# Patient Record
Sex: FEMALE | Race: BLACK OR AFRICAN AMERICAN | NOT HISPANIC OR LATINO | URBAN - METROPOLITAN AREA
[De-identification: names, ages, dates, MRNs, and addresses within clinical notes are randomized per-mention and may not be internally consistent; named-entity substitution may affect disease eponyms.]

---

## 2023-08-25 LAB
EXTERNAL ABO GROUPING: NORMAL
EXTERNAL ANTIBODY SCREEN: NORMAL
EXTERNAL CHLAMYDIA SCREEN: NEGATIVE
EXTERNAL GONORRHEA SCREEN: NEGATIVE
EXTERNAL HEPATITIS B SURFACE ANTIGEN: NEGATIVE
EXTERNAL RH FACTOR: POSITIVE
EXTERNAL RUBELLA IGG QUANTITATION: POSITIVE
HEMOGLOBIN IDENTIFICATION INTERPRETATION EXTERNAL: NORMAL
HEPATITIS C VIRUS AB PRESENCE IN SERUM EXTERNAL: NONREACTIVE
HIV 1/ 2 AG/AB SCREEN EXTERNAL: NORMAL
SYPHILIS TOTAL AB EXTERNAL: NONREACTIVE

## 2023-12-14 ENCOUNTER — ANCILLARY PROCEDURE (OUTPATIENT)
Dept: RADIOLOGY | Facility: CLINIC | Age: 43
End: 2023-12-14
Payer: COMMERCIAL

## 2023-12-14 DIAGNOSIS — Z34.90 ENCOUNTER FOR SUPERVISION OF NORMAL PREGNANCY, UNSPECIFIED, UNSPECIFIED TRIMESTER (HHS-HCC): ICD-10-CM

## 2023-12-14 DIAGNOSIS — O09.529 AMA (ADVANCED MATERNAL AGE) MULTIGRAVIDA 35+ (HHS-HCC): ICD-10-CM

## 2023-12-14 PROCEDURE — 76811 OB US DETAILED SNGL FETUS: CPT | Performed by: OBSTETRICS & GYNECOLOGY

## 2023-12-14 PROCEDURE — 76811 OB US DETAILED SNGL FETUS: CPT

## 2023-12-14 PROCEDURE — 76819 FETAL BIOPHYS PROFIL W/O NST: CPT

## 2023-12-14 PROCEDURE — 76819 FETAL BIOPHYS PROFIL W/O NST: CPT | Performed by: OBSTETRICS & GYNECOLOGY

## 2023-12-23 LAB — GLUCOSE, 1 HR SCREEN, PREG EXTERNAL: 144 MG/DL

## 2024-01-11 ENCOUNTER — ANCILLARY PROCEDURE (OUTPATIENT)
Dept: RADIOLOGY | Facility: CLINIC | Age: 44
End: 2024-01-11
Payer: COMMERCIAL

## 2024-01-11 DIAGNOSIS — Z34.90 ENCOUNTER FOR SUPERVISION OF NORMAL PREGNANCY, UNSPECIFIED, UNSPECIFIED TRIMESTER (HHS-HCC): ICD-10-CM

## 2024-01-11 PROCEDURE — 76816 OB US FOLLOW-UP PER FETUS: CPT | Performed by: OBSTETRICS & GYNECOLOGY

## 2024-01-11 PROCEDURE — 76819 FETAL BIOPHYS PROFIL W/O NST: CPT

## 2024-01-11 PROCEDURE — 76816 OB US FOLLOW-UP PER FETUS: CPT

## 2024-01-11 PROCEDURE — 76819 FETAL BIOPHYS PROFIL W/O NST: CPT | Performed by: OBSTETRICS & GYNECOLOGY

## 2024-01-16 LAB
GLUCOSE THREE HOUR EXTERNAL: 119 MG/DL
GLUCOSE TWO HOUR EXTERNAL: 190 MG/DL
GLUCOSE, FASTING EXTERNAL: 74 MG/DL
GLUCOSE, ONE HOUR EXTERNAL: 182 MG/DL

## 2024-02-02 LAB — GROUP B STREP SCREEN EXTERNAL: NORMAL

## 2024-02-06 ENCOUNTER — HOSPITAL ENCOUNTER (OUTPATIENT)
Dept: RADIOLOGY | Facility: CLINIC | Age: 44
Discharge: HOME | End: 2024-02-06
Payer: COMMERCIAL

## 2024-02-06 DIAGNOSIS — O16.9 HYPERTENSION AFFECTING PREGNANCY (HHS-HCC): ICD-10-CM

## 2024-02-06 DIAGNOSIS — Z34.90 PREGNANT (HHS-HCC): ICD-10-CM

## 2024-02-06 PROCEDURE — 76819 FETAL BIOPHYS PROFIL W/O NST: CPT | Performed by: OBSTETRICS & GYNECOLOGY

## 2024-02-06 PROCEDURE — 76816 OB US FOLLOW-UP PER FETUS: CPT | Performed by: OBSTETRICS & GYNECOLOGY

## 2024-02-06 PROCEDURE — 76819 FETAL BIOPHYS PROFIL W/O NST: CPT

## 2024-02-06 PROCEDURE — 76816 OB US FOLLOW-UP PER FETUS: CPT

## 2024-02-19 ENCOUNTER — ANESTHESIA EVENT (OUTPATIENT)
Dept: OBSTETRICS AND GYNECOLOGY | Facility: HOSPITAL | Age: 44
End: 2024-02-19
Payer: COMMERCIAL

## 2024-02-19 ENCOUNTER — HOSPITAL ENCOUNTER (INPATIENT)
Facility: HOSPITAL | Age: 44
LOS: 4 days | Discharge: HOME | End: 2024-02-23
Attending: OBSTETRICS & GYNECOLOGY | Admitting: OBSTETRICS & GYNECOLOGY
Payer: COMMERCIAL

## 2024-02-19 ENCOUNTER — APPOINTMENT (OUTPATIENT)
Dept: OBSTETRICS AND GYNECOLOGY | Facility: HOSPITAL | Age: 44
End: 2024-02-19
Payer: COMMERCIAL

## 2024-02-19 ENCOUNTER — ANESTHESIA (OUTPATIENT)
Dept: OBSTETRICS AND GYNECOLOGY | Facility: HOSPITAL | Age: 44
End: 2024-02-19
Payer: COMMERCIAL

## 2024-02-19 DIAGNOSIS — O10.919 CHRONIC HYPERTENSION AFFECTING PREGNANCY (HHS-HCC): Primary | ICD-10-CM

## 2024-02-19 PROBLEM — I10 HTN (HYPERTENSION): Status: ACTIVE | Noted: 2024-02-19

## 2024-02-19 PROBLEM — Z3A.39 PREGNANCY WITH 39 COMPLETED WEEKS GESTATION (HHS-HCC): Status: ACTIVE | Noted: 2024-02-19

## 2024-02-19 LAB
ABO GROUP (TYPE) IN BLOOD: NORMAL
ABO GROUP (TYPE) IN BLOOD: NORMAL
ALBUMIN SERPL BCP-MCNC: 3.2 G/DL (ref 3.4–5)
ALP SERPL-CCNC: 142 U/L (ref 33–110)
ALT SERPL W P-5'-P-CCNC: 17 U/L (ref 7–45)
ANION GAP SERPL CALC-SCNC: 14 MMOL/L (ref 10–20)
ANTIBODY SCREEN: NORMAL
AST SERPL W P-5'-P-CCNC: 22 U/L (ref 9–39)
BILIRUB SERPL-MCNC: 0.6 MG/DL (ref 0–1.2)
BUN SERPL-MCNC: 10 MG/DL (ref 6–23)
CALCIUM SERPL-MCNC: 9 MG/DL (ref 8.6–10.6)
CHLORIDE SERPL-SCNC: 107 MMOL/L (ref 98–107)
CO2 SERPL-SCNC: 23 MMOL/L (ref 21–32)
CREAT SERPL-MCNC: 0.69 MG/DL (ref 0.5–1.05)
CREAT UR-MCNC: 152.1 MG/DL (ref 20–320)
EGFRCR SERPLBLD CKD-EPI 2021: >90 ML/MIN/1.73M*2
ERYTHROCYTE [DISTWIDTH] IN BLOOD BY AUTOMATED COUNT: 15.9 % (ref 11.5–14.5)
GLUCOSE BLD MANUAL STRIP-MCNC: 81 MG/DL (ref 74–99)
GLUCOSE SERPL-MCNC: 60 MG/DL (ref 74–99)
HCT VFR BLD AUTO: 34 % (ref 36–46)
HGB BLD-MCNC: 11.1 G/DL (ref 12–16)
MCH RBC QN AUTO: 24.9 PG (ref 26–34)
MCHC RBC AUTO-ENTMCNC: 32.6 G/DL (ref 32–36)
MCV RBC AUTO: 76 FL (ref 80–100)
NRBC BLD-RTO: 0.9 /100 WBCS (ref 0–0)
PLATELET # BLD AUTO: 209 X10*3/UL (ref 150–450)
POTASSIUM SERPL-SCNC: 4.5 MMOL/L (ref 3.5–5.3)
PROT SERPL-MCNC: 5.4 G/DL (ref 6.4–8.2)
PROT UR-ACNC: 20 MG/DL (ref 5–24)
PROT/CREAT UR: 0.13 MG/MG CREAT (ref 0–0.17)
RBC # BLD AUTO: 4.46 X10*6/UL (ref 4–5.2)
RH FACTOR (ANTIGEN D): NORMAL
RH FACTOR (ANTIGEN D): NORMAL
SODIUM SERPL-SCNC: 139 MMOL/L (ref 136–145)
TREPONEMA PALLIDUM IGG+IGM AB [PRESENCE] IN SERUM OR PLASMA BY IMMUNOASSAY: NONREACTIVE
WBC # BLD AUTO: 5.6 X10*3/UL (ref 4.4–11.3)

## 2024-02-19 PROCEDURE — 82947 ASSAY GLUCOSE BLOOD QUANT: CPT

## 2024-02-19 PROCEDURE — 82570 ASSAY OF URINE CREATININE: CPT

## 2024-02-19 PROCEDURE — 86780 TREPONEMA PALLIDUM: CPT | Performed by: STUDENT IN AN ORGANIZED HEALTH CARE EDUCATION/TRAINING PROGRAM

## 2024-02-19 PROCEDURE — 1120000001 HC OB PRIVATE ROOM DAILY

## 2024-02-19 PROCEDURE — 36415 COLL VENOUS BLD VENIPUNCTURE: CPT | Performed by: OBSTETRICS & GYNECOLOGY

## 2024-02-19 PROCEDURE — 3E0P7VZ INTRODUCTION OF HORMONE INTO FEMALE REPRODUCTIVE, VIA NATURAL OR ARTIFICIAL OPENING: ICD-10-PCS | Performed by: OBSTETRICS & GYNECOLOGY

## 2024-02-19 PROCEDURE — 7210000002 HC LABOR PER HOUR

## 2024-02-19 PROCEDURE — 36415 COLL VENOUS BLD VENIPUNCTURE: CPT | Performed by: STUDENT IN AN ORGANIZED HEALTH CARE EDUCATION/TRAINING PROGRAM

## 2024-02-19 PROCEDURE — 85027 COMPLETE CBC AUTOMATED: CPT | Performed by: STUDENT IN AN ORGANIZED HEALTH CARE EDUCATION/TRAINING PROGRAM

## 2024-02-19 PROCEDURE — 99222 1ST HOSP IP/OBS MODERATE 55: CPT | Performed by: STUDENT IN AN ORGANIZED HEALTH CARE EDUCATION/TRAINING PROGRAM

## 2024-02-19 PROCEDURE — 2500000001 HC RX 250 WO HCPCS SELF ADMINISTERED DRUGS (ALT 637 FOR MEDICARE OP): Performed by: ADVANCED PRACTICE MIDWIFE

## 2024-02-19 PROCEDURE — 86901 BLOOD TYPING SEROLOGIC RH(D): CPT | Performed by: STUDENT IN AN ORGANIZED HEALTH CARE EDUCATION/TRAINING PROGRAM

## 2024-02-19 PROCEDURE — 80053 COMPREHEN METABOLIC PANEL: CPT

## 2024-02-19 RX ORDER — MISOPROSTOL 200 UG/1
800 TABLET ORAL ONCE AS NEEDED
Status: DISCONTINUED | OUTPATIENT
Start: 2024-02-19 | End: 2024-02-22 | Stop reason: HOSPADM

## 2024-02-19 RX ORDER — FERROUS SULFATE 325(65) MG
325 TABLET ORAL
COMMUNITY

## 2024-02-19 RX ORDER — ONDANSETRON HYDROCHLORIDE 2 MG/ML
4 INJECTION, SOLUTION INTRAVENOUS EVERY 6 HOURS PRN
Status: DISCONTINUED | OUTPATIENT
Start: 2024-02-19 | End: 2024-02-22

## 2024-02-19 RX ORDER — OXYTOCIN 10 [USP'U]/ML
10 INJECTION, SOLUTION INTRAMUSCULAR; INTRAVENOUS ONCE AS NEEDED
Status: DISCONTINUED | OUTPATIENT
Start: 2024-02-19 | End: 2024-02-22 | Stop reason: HOSPADM

## 2024-02-19 RX ORDER — NIFEDIPINE 30 MG/1
30 TABLET, FILM COATED, EXTENDED RELEASE ORAL
COMMUNITY
End: 2024-03-14 | Stop reason: SDUPTHER

## 2024-02-19 RX ORDER — OXYTOCIN/0.9 % SODIUM CHLORIDE 30/500 ML
60 PLASTIC BAG, INJECTION (ML) INTRAVENOUS ONCE AS NEEDED
Status: COMPLETED | OUTPATIENT
Start: 2024-02-19 | End: 2024-02-21

## 2024-02-19 RX ORDER — ONDANSETRON 4 MG/1
4 TABLET, FILM COATED ORAL EVERY 6 HOURS PRN
Status: DISCONTINUED | OUTPATIENT
Start: 2024-02-19 | End: 2024-02-22

## 2024-02-19 RX ORDER — LIDOCAINE HYDROCHLORIDE 10 MG/ML
30 INJECTION INFILTRATION; PERINEURAL ONCE AS NEEDED
Status: DISCONTINUED | OUTPATIENT
Start: 2024-02-19 | End: 2024-02-22 | Stop reason: HOSPADM

## 2024-02-19 RX ORDER — METHYLERGONOVINE MALEATE 0.2 MG/ML
0.2 INJECTION INTRAVENOUS ONCE AS NEEDED
Status: DISCONTINUED | OUTPATIENT
Start: 2024-02-19 | End: 2024-02-22 | Stop reason: HOSPADM

## 2024-02-19 RX ORDER — HYDRALAZINE HYDROCHLORIDE 20 MG/ML
5 INJECTION INTRAMUSCULAR; INTRAVENOUS ONCE AS NEEDED
Status: DISCONTINUED | OUTPATIENT
Start: 2024-02-19 | End: 2024-02-22 | Stop reason: HOSPADM

## 2024-02-19 RX ORDER — SODIUM CHLORIDE, SODIUM LACTATE, POTASSIUM CHLORIDE, CALCIUM CHLORIDE 600; 310; 30; 20 MG/100ML; MG/100ML; MG/100ML; MG/100ML
125 INJECTION, SOLUTION INTRAVENOUS CONTINUOUS
Status: DISCONTINUED | OUTPATIENT
Start: 2024-02-19 | End: 2024-02-22

## 2024-02-19 RX ORDER — TERBUTALINE SULFATE 1 MG/ML
0.25 INJECTION SUBCUTANEOUS ONCE AS NEEDED
Status: DISCONTINUED | OUTPATIENT
Start: 2024-02-19 | End: 2024-02-22 | Stop reason: HOSPADM

## 2024-02-19 RX ORDER — METOCLOPRAMIDE 10 MG/1
10 TABLET ORAL EVERY 6 HOURS PRN
Status: DISCONTINUED | OUTPATIENT
Start: 2024-02-19 | End: 2024-02-22

## 2024-02-19 RX ORDER — TRANEXAMIC ACID 100 MG/ML
1000 INJECTION, SOLUTION INTRAVENOUS ONCE AS NEEDED
Status: DISCONTINUED | OUTPATIENT
Start: 2024-02-19 | End: 2024-02-22 | Stop reason: HOSPADM

## 2024-02-19 RX ORDER — ASPIRIN 81 MG/1
81 TABLET ORAL DAILY
COMMUNITY

## 2024-02-19 RX ORDER — CARBOPROST TROMETHAMINE 250 UG/ML
250 INJECTION, SOLUTION INTRAMUSCULAR ONCE AS NEEDED
Status: DISCONTINUED | OUTPATIENT
Start: 2024-02-19 | End: 2024-02-22 | Stop reason: HOSPADM

## 2024-02-19 RX ORDER — LABETALOL HYDROCHLORIDE 5 MG/ML
20 INJECTION, SOLUTION INTRAVENOUS ONCE AS NEEDED
Status: DISCONTINUED | OUTPATIENT
Start: 2024-02-19 | End: 2024-02-22 | Stop reason: HOSPADM

## 2024-02-19 RX ORDER — NIFEDIPINE 30 MG/1
30 TABLET, FILM COATED, EXTENDED RELEASE ORAL
Status: DISCONTINUED | OUTPATIENT
Start: 2024-02-20 | End: 2024-02-23 | Stop reason: HOSPADM

## 2024-02-19 RX ORDER — NIFEDIPINE 10 MG/1
10 CAPSULE ORAL ONCE AS NEEDED
Status: DISCONTINUED | OUTPATIENT
Start: 2024-02-19 | End: 2024-02-22 | Stop reason: HOSPADM

## 2024-02-19 RX ORDER — METOCLOPRAMIDE HYDROCHLORIDE 5 MG/ML
10 INJECTION INTRAMUSCULAR; INTRAVENOUS EVERY 6 HOURS PRN
Status: DISCONTINUED | OUTPATIENT
Start: 2024-02-19 | End: 2024-02-22

## 2024-02-19 RX ORDER — LOPERAMIDE HYDROCHLORIDE 2 MG/1
4 CAPSULE ORAL EVERY 2 HOUR PRN
Status: DISCONTINUED | OUTPATIENT
Start: 2024-02-19 | End: 2024-02-22 | Stop reason: HOSPADM

## 2024-02-19 RX ADMIN — MISOPROSTOL 25 MCG: 100 TABLET ORAL at 21:50

## 2024-02-19 RX ADMIN — MISOPROSTOL 25 MCG: 100 TABLET ORAL at 16:10

## 2024-02-19 SDOH — HEALTH STABILITY: MENTAL HEALTH: WISH TO BE DEAD (PAST 1 MONTH): NO

## 2024-02-19 SDOH — HEALTH STABILITY: MENTAL HEALTH: WERE YOU ABLE TO COMPLETE ALL THE BEHAVIORAL HEALTH SCREENINGS?: YES

## 2024-02-19 SDOH — HEALTH STABILITY: MENTAL HEALTH: SUICIDAL BEHAVIOR (LIFETIME): NO

## 2024-02-19 SDOH — ECONOMIC STABILITY: FOOD INSECURITY: WITHIN THE PAST 12 MONTHS, THE FOOD YOU BOUGHT JUST DIDN'T LAST AND YOU DIDN'T HAVE MONEY TO GET MORE.: NEVER TRUE

## 2024-02-19 SDOH — SOCIAL STABILITY: SOCIAL INSECURITY: DOES ANYONE TRY TO KEEP YOU FROM HAVING/CONTACTING OTHER FRIENDS OR DOING THINGS OUTSIDE YOUR HOME?: NO

## 2024-02-19 SDOH — SOCIAL STABILITY: SOCIAL INSECURITY: ABUSE SCREEN: ADULT

## 2024-02-19 SDOH — ECONOMIC STABILITY: FOOD INSECURITY: WITHIN THE PAST 12 MONTHS, YOU WORRIED THAT YOUR FOOD WOULD RUN OUT BEFORE YOU GOT MONEY TO BUY MORE.: NEVER TRUE

## 2024-02-19 SDOH — SOCIAL STABILITY: SOCIAL INSECURITY: HAVE YOU HAD THOUGHTS OF HARMING ANYONE ELSE?: NO

## 2024-02-19 SDOH — SOCIAL STABILITY: SOCIAL INSECURITY: ARE YOU OR HAVE YOU BEEN THREATENED OR ABUSED PHYSICALLY, EMOTIONALLY, OR SEXUALLY BY ANYONE?: NO

## 2024-02-19 SDOH — HEALTH STABILITY: MENTAL HEALTH: NON-SPECIFIC ACTIVE SUICIDAL THOUGHTS (PAST 1 MONTH): NO

## 2024-02-19 SDOH — ECONOMIC STABILITY: HOUSING INSECURITY: DO YOU FEEL UNSAFE GOING BACK TO THE PLACE WHERE YOU ARE LIVING?: NO

## 2024-02-19 SDOH — SOCIAL STABILITY: SOCIAL INSECURITY: PHYSICAL ABUSE: DENIES

## 2024-02-19 SDOH — SOCIAL STABILITY: SOCIAL INSECURITY: VERBAL ABUSE: DENIES

## 2024-02-19 SDOH — SOCIAL STABILITY: SOCIAL INSECURITY: ARE THERE ANY APPARENT SIGNS OF INJURIES/BEHAVIORS THAT COULD BE RELATED TO ABUSE/NEGLECT?: NO

## 2024-02-19 SDOH — SOCIAL STABILITY: SOCIAL INSECURITY: DO YOU FEEL ANYONE HAS EXPLOITED OR TAKEN ADVANTAGE OF YOU FINANCIALLY OR OF YOUR PERSONAL PROPERTY?: NO

## 2024-02-19 SDOH — HEALTH STABILITY: MENTAL HEALTH: HAVE YOU USED ANY SUBSTANCES (CANABIS, COCAINE, HEROIN, HALLUCINOGENS, INHALANTS, ETC.) IN THE PAST 12 MONTHS?: NO

## 2024-02-19 SDOH — HEALTH STABILITY: MENTAL HEALTH: HAVE YOU USED ANY PRESCRIPTION DRUGS OTHER THAN PRESCRIBED IN THE PAST 12 MONTHS?: NO

## 2024-02-19 SDOH — SOCIAL STABILITY: SOCIAL INSECURITY: HAS ANYONE EVER THREATENED TO HURT YOUR FAMILY OR YOUR PETS?: NO

## 2024-02-19 ASSESSMENT — PAIN SCALES - GENERAL
PAINLEVEL_OUTOF10: 0 - NO PAIN

## 2024-02-19 ASSESSMENT — SOCIAL DETERMINANTS OF HEALTH (SDOH)
WITHIN THE LAST YEAR, HAVE YOU BEEN HUMILIATED OR EMOTIONALLY ABUSED IN OTHER WAYS BY YOUR PARTNER OR EX-PARTNER?: NO
WITHIN THE LAST YEAR, HAVE YOU BEEN AFRAID OF YOUR PARTNER OR EX-PARTNER?: NO
WITHIN THE LAST YEAR, HAVE TO BEEN RAPED OR FORCED TO HAVE ANY KIND OF SEXUAL ACTIVITY BY YOUR PARTNER OR EX-PARTNER?: NO
WITHIN THE LAST YEAR, HAVE YOU BEEN KICKED, HIT, SLAPPED, OR OTHERWISE PHYSICALLY HURT BY YOUR PARTNER OR EX-PARTNER?: NO

## 2024-02-19 ASSESSMENT — LIFESTYLE VARIABLES
AUDIT-C TOTAL SCORE: 0
HOW OFTEN DO YOU HAVE A DRINK CONTAINING ALCOHOL: NEVER
AUDIT-C TOTAL SCORE: 0
SKIP TO QUESTIONS 9-10: 1
HOW OFTEN DO YOU HAVE 6 OR MORE DRINKS ON ONE OCCASION: NEVER
HOW MANY STANDARD DRINKS CONTAINING ALCOHOL DO YOU HAVE ON A TYPICAL DAY: PATIENT DOES NOT DRINK

## 2024-02-19 ASSESSMENT — PATIENT HEALTH QUESTIONNAIRE - PHQ9
2. FEELING DOWN, DEPRESSED OR HOPELESS: NOT AT ALL
1. LITTLE INTEREST OR PLEASURE IN DOING THINGS: NOT AT ALL
SUM OF ALL RESPONSES TO PHQ9 QUESTIONS 1 & 2: 0

## 2024-02-19 ASSESSMENT — ACTIVITIES OF DAILY LIVING (ADL): LACK_OF_TRANSPORTATION: NO

## 2024-02-19 NOTE — ANESTHESIA PREPROCEDURE EVALUATION
Patient: Carlota Garrido    Evaluation Method: In-person visit    Procedure Information    Date: 02/19/24  Procedure: Labor Consult         Relevant Problems   Cardiovascular   (+) HTN (hypertension)      Endocrine (within normal limits)      GI (within normal limits)      Neuro/Psych (within normal limits)      Pulmonary (within normal limits)      GI/Hepatic (within normal limits)      Hematology (within normal limits)       Clinical information reviewed:   Tobacco  Allergies    Med Hx  Surg Hx   Fam Hx          NPO Detail:  No data recorded     OB/Gyn Evaluation    Present Pregnancy    Patient is pregnant now.   Obstetric History                Physical Exam    Airway  Mallampati: I  TM distance: >3 FB  Neck ROM: full     Cardiovascular - normal exam     Dental - normal exam     Pulmonary - normal exam     Abdominal - normal exam             Anesthesia Plan    History of general anesthesia?: yes  History of complications of general anesthesia?: no    ASA 2     general and epidural     Anesthetic plan and risks discussed with patient.    Plan discussed with attending.

## 2024-02-19 NOTE — H&P
Obstetrical Admission History and Physical     Carlota Garrido is a 43 y.o.  at 39w0d. LUIS: 2024, by Last Menstrual Period. Estimated fetal weight: 2990g -> 3590g (US ). She has had prenatal care with [facility] .    Chief Complaint: No chief complaint on file.    IOL  - Admitted, consented, scanned - cephalic  - IOL with crb and cyto, cytotec #1 placed   - For pitocin and AROM when appropriate  - Epidural as needed per patient preference     Maternal wellbeing  - 5 x 5 x 4 cm fibroid   - Hx of laparoscopic surgery w/umbilical endo    cHTN  - Nifed 30  - Normotensive on admission  - Asymptomatic  - HELLP labs pending on admission    Fetal wellbeing   - PNLs reviewed, wnl - PNC in [place] and with [facility] (records scanned into chart)  - Abnl 1 hr, nl 3hr, US : 2990g (43%ile), AC 68%ile   - GBS neg    Postpartum   - ppBC: declines    Seen and discussed with Dr. Camille Jernigan MD     Assessment/Plan    Principal Problem:    Labor and delivery, indication for care  Active Problems:    Chronic hypertension affecting pregnancy    Options for delivery have been discussed with the patient and she elects for an induction of labor.  Cervical ripening with cytotec, cervidil, other prostaglandin agents has been discussed.  Induction of labor with pitocin, amniotomy, cytotec, and cervical balloon have been discussed in detail. The risks, benefits, complications, alternatives, expected outcomes, potential problems during recuperation and recovery, and the risks of not performing the procedure were discussed with the patient. The patient stated understanding that the risks of delivery include, but are not limited to: death; reaction to medications; injury to bowel, bladder, ureters, uterus, cervix, vagina, and other pelvic and abdominal structures, infection; blood loss and possible need for transfusion; and potential need for surgery, including hysterectomy. The risks of injury to the infant during  delivery were also discussed. All questions were answered. There was concurrence with the planned procedure, and the patient wanted to proceed.    Admit to inpatient status. I anticipate that this patient will require a stay exceeding at least 2 midnights for delivery and postpartum.  Induction of labor.  Management of pregnancy complications, as indicated.    Subjective   Good fetal movement. Denies vaginal bleeding., Denies contractions., Denies leaking of fluid.       Reason for Induction of Labor:  Chronic hypertension     Obstetrical History   OB History    Para Term  AB Living   1             SAB IAB Ectopic Multiple Live Births                  # Outcome Date GA Lbr Cesar/2nd Weight Sex Delivery Anes PTL Lv   1 Current              Past Medical History  Past Medical History:   Diagnosis Date    Chronic hypertension     Encounter for gynecological examination (general) (routine) without abnormal findings 2014    Encounter for routine gynecological examination    Endometriosis      Past Surgical History   Past Surgical History:   Procedure Laterality Date    CERVICAL BIOPSY  W/ LOOP ELECTRODE EXCISION  2014    Cervical Loop Electrosurgical Excision (LEEP)    OTHER SURGICAL HISTORY  2014    Biopsy Skin     Social History  Social History     Tobacco Use    Smoking status: Never    Smokeless tobacco: Never   Substance Use Topics    Alcohol use: Never     Substance and Sexual Activity   Drug Use Never     Allergies  Patient has no known allergies.     Medications  Medications Prior to Admission   Medication Sig Dispense Refill Last Dose    aspirin 81 mg EC tablet Take 1 tablet (81 mg) by mouth once daily.       ferrous sulfate, 325 mg ferrous sulfate, tablet Take 1 tablet by mouth once daily with breakfast.       NIFEdipine ER (Adalat CC) 30 mg 24 hr tablet Take 1 tablet (30 mg) by mouth once daily in the morning. Take before meals. Do not crush, chew, or split.   2024      Objective    Last Vitals  Temp Pulse Resp BP MAP O2 Sat   36.6 °C (97.9 °F) 81 16 116/60         Physical Examination  GENERAL: well developed, well nourished female in no acute distress  HEENT: clear sclera  NECK: supple  LUNGS: breathing comfortably on room air  HEART: warm and well-perfused  SKIN: no rashes or lesions  NEUROLOGICAL: grossly intact bilaterally  PSYCHOLOGICAL: appropriate affect     SVE: 1.5/80/-2, external os s/p LEEP   Fetal Assessment  Movement: Present  Mode: External US  Baseline Fetal Heart Rate (bpm): 145 bpm  Baseline Classification: Normal  Variability: Moderate (Between 6 and 25 BPM)  Pattern: Accelerations  FHR Category: Category I      Contraction Frequency: irritability

## 2024-02-20 ENCOUNTER — ANESTHESIA (OUTPATIENT)
Dept: OBSTETRICS AND GYNECOLOGY | Facility: HOSPITAL | Age: 44
End: 2024-02-20
Payer: COMMERCIAL

## 2024-02-20 ENCOUNTER — ANESTHESIA EVENT (OUTPATIENT)
Dept: OBSTETRICS AND GYNECOLOGY | Facility: HOSPITAL | Age: 44
End: 2024-02-20
Payer: COMMERCIAL

## 2024-02-20 PROCEDURE — 7210000002 HC LABOR PER HOUR

## 2024-02-20 PROCEDURE — 51701 INSERT BLADDER CATHETER: CPT

## 2024-02-20 PROCEDURE — 01967 NEURAXL LBR ANES VAG DLVR: CPT | Performed by: ANESTHESIOLOGIST ASSISTANT

## 2024-02-20 PROCEDURE — 3E033VJ INTRODUCTION OF OTHER HORMONE INTO PERIPHERAL VEIN, PERCUTANEOUS APPROACH: ICD-10-PCS

## 2024-02-20 PROCEDURE — 2500000004 HC RX 250 GENERAL PHARMACY W/ HCPCS (ALT 636 FOR OP/ED)

## 2024-02-20 PROCEDURE — 01967 NEURAXL LBR ANES VAG DLVR: CPT | Performed by: STUDENT IN AN ORGANIZED HEALTH CARE EDUCATION/TRAINING PROGRAM

## 2024-02-20 PROCEDURE — 10907ZC DRAINAGE OF AMNIOTIC FLUID, THERAPEUTIC FROM PRODUCTS OF CONCEPTION, VIA NATURAL OR ARTIFICIAL OPENING: ICD-10-PCS

## 2024-02-20 PROCEDURE — 2500000005 HC RX 250 GENERAL PHARMACY W/O HCPCS: Performed by: ANESTHESIOLOGIST ASSISTANT

## 2024-02-20 PROCEDURE — 2500000004 HC RX 250 GENERAL PHARMACY W/ HCPCS (ALT 636 FOR OP/ED): Performed by: STUDENT IN AN ORGANIZED HEALTH CARE EDUCATION/TRAINING PROGRAM

## 2024-02-20 PROCEDURE — 1120000001 HC OB PRIVATE ROOM DAILY

## 2024-02-20 PROCEDURE — 2500000002 HC RX 250 W HCPCS SELF ADMINISTERED DRUGS (ALT 637 FOR MEDICARE OP, ALT 636 FOR OP/ED): Performed by: STUDENT IN AN ORGANIZED HEALTH CARE EDUCATION/TRAINING PROGRAM

## 2024-02-20 PROCEDURE — 2500000005 HC RX 250 GENERAL PHARMACY W/O HCPCS

## 2024-02-20 RX ORDER — OXYTOCIN/0.9 % SODIUM CHLORIDE 30/500 ML
2-30 PLASTIC BAG, INJECTION (ML) INTRAVENOUS CONTINUOUS
Status: DISCONTINUED | OUTPATIENT
Start: 2024-02-20 | End: 2024-02-22

## 2024-02-20 RX ORDER — ACETAMINOPHEN 325 MG/1
975 TABLET ORAL ONCE
Status: COMPLETED | OUTPATIENT
Start: 2024-02-20 | End: 2024-02-20

## 2024-02-20 RX ORDER — FENTANYL/BUPIVACAINE/NS/PF 2MCG/ML-.1
PLASTIC BAG, INJECTION (ML) INJECTION AS NEEDED
Status: DISCONTINUED | OUTPATIENT
Start: 2024-02-20 | End: 2024-02-21

## 2024-02-20 RX ORDER — FENTANYL/BUPIVACAINE/NS/PF 2MCG/ML-.1
PLASTIC BAG, INJECTION (ML) INJECTION CONTINUOUS PRN
Status: DISCONTINUED | OUTPATIENT
Start: 2024-02-20 | End: 2024-02-21

## 2024-02-20 RX ADMIN — Medication 2 ML: at 19:02

## 2024-02-20 RX ADMIN — Medication 5 ML: at 18:53

## 2024-02-20 RX ADMIN — Medication 3 ML: at 18:57

## 2024-02-20 RX ADMIN — NIFEDIPINE 30 MG: 30 TABLET, FILM COATED, EXTENDED RELEASE ORAL at 07:13

## 2024-02-20 RX ADMIN — Medication 14 ML/HR: at 19:02

## 2024-02-20 RX ADMIN — Medication: at 16:30

## 2024-02-20 RX ADMIN — SODIUM CHLORIDE, POTASSIUM CHLORIDE, SODIUM LACTATE AND CALCIUM CHLORIDE 125 ML/HR: 600; 310; 30; 20 INJECTION, SOLUTION INTRAVENOUS at 03:30

## 2024-02-20 RX ADMIN — SODIUM CHLORIDE, POTASSIUM CHLORIDE, SODIUM LACTATE AND CALCIUM CHLORIDE 125 ML/HR: 600; 310; 30; 20 INJECTION, SOLUTION INTRAVENOUS at 22:22

## 2024-02-20 RX ADMIN — Medication 2 MILLI-UNITS/MIN: at 03:30

## 2024-02-20 RX ADMIN — ACETAMINOPHEN 975 MG: 325 TABLET ORAL at 17:09

## 2024-02-20 RX ADMIN — METOCLOPRAMIDE 10 MG: 5 INJECTION, SOLUTION INTRAMUSCULAR; INTRAVENOUS at 16:38

## 2024-02-20 RX ADMIN — SODIUM CHLORIDE, POTASSIUM CHLORIDE, SODIUM LACTATE AND CALCIUM CHLORIDE 125 ML/HR: 600; 310; 30; 20 INJECTION, SOLUTION INTRAVENOUS at 11:26

## 2024-02-20 RX ADMIN — ONDANSETRON 4 MG: 2 INJECTION INTRAMUSCULAR; INTRAVENOUS at 15:21

## 2024-02-20 RX ADMIN — SODIUM CHLORIDE, POTASSIUM CHLORIDE, SODIUM LACTATE AND CALCIUM CHLORIDE 500 ML: 600; 310; 30; 20 INJECTION, SOLUTION INTRAVENOUS at 17:45

## 2024-02-20 ASSESSMENT — PAIN SCALES - GENERAL
PAINLEVEL_OUTOF10: 0 - NO PAIN
PAINLEVEL_OUTOF10: 8
PAINLEVEL_OUTOF10: 0 - NO PAIN
PAINLEVEL_OUTOF10: 0 - NO PAIN
PAINLEVEL_OUTOF10: 4
PAINLEVEL_OUTOF10: 0 - NO PAIN
PAINLEVEL_OUTOF10: 4
PAINLEVEL_OUTOF10: 0 - NO PAIN
PAINLEVEL_OUTOF10: 4
PAINLEVEL_OUTOF10: 0 - NO PAIN

## 2024-02-20 NOTE — SIGNIFICANT EVENT
"Labor Check      S: Patient amenable to CRB attempt, Cyto #2         O:  /68   Pulse 78   Temp 36.6 °C (97.9 °F) (Tympanic)   Resp 16   Ht 1.575 m (5' 2\")   Wt 80.2 kg (176 lb 12.9 oz)   LMP 05/22/2023   SpO2 99%   BMI 32.34 kg/m²      SVE: 1/80/-2     NST  Baseline: 135  Variability: mod   Accels: +  Decels: -     [place]: q2-4 min          A/P  -  Latent Labor.   - CEFM Cat I currently  - S/p unsuccessful CRB attempt. Cyto #2 placed   - Will start Pit and AROM when appropriate   - Continue position changes, as tolerated  - GBS negative  - Recheck as clinically indicated by maternal or fetal status or PRN   - Anticipate NSVB.      Pt. d/w Fani Roque MD  PGY-1, Obstetrics & Gynecology   [facility]       "

## 2024-02-20 NOTE — CARE PLAN
The clinical goals for the shift include healthy mom/ healthy baby    Patient admitted this shift for IOL. Cytotec x1 placed by MD this shift. Patient is currently stable.     Hazel Cabello RN

## 2024-02-20 NOTE — SIGNIFICANT EVENT
"Labor Progress Note    Subjective:   Patient having mildly painful ctx    Objective:  Vitals:  BP (!) 151/79   Pulse 93   Temp 36.5 °C (97.7 °F) (Temporal)   Resp 19   Ht 1.575 m (5' 2\")   Wt 80.2 kg (176 lb 12.9 oz)   LMP 05/22/2023   SpO2 (!) 81% Comment: probe falling off pt finger. inaccurate reading  BMI 32.34 kg/m²   Cervical Exam  Dilation: 4  Effacement (%): 80  Fetal Station: -2  Presentation: Vertex (Per MD Matute Per BSUS)  Method: Manual  OB Examiner: Giovani FERGUSON  Fetal Assessment  Movement: Present  Mode: External US  Baseline Fetal Heart Rate (bpm): 135 bpm  Baseline Classification: Normal  Variability: Moderate (Between 6 and 25 BPM)  Pattern: Accelerations, Variable decelerations  FHR Category: Category I      Contraction Frequency: 1.5-4      On SVE, bag palpated. Patient reports minimal leakage of fluid after AROM attempt at 0600.     Fetal head palpated with no other presenting parts. AROM for copious clear fluid.      A/P:    Latent labor:   - Titrate pitocin per protocol, currently at 14  - in setting of minimal LOF after initial AROM attempt and amount of fluid at 2nd attempt, AROM time 1400  - plan for ncb  - CEFM; Cat 1 currently  - GBS neg    Discussed with Dr. Judy Matute MD PGY-1      "

## 2024-02-20 NOTE — ANESTHESIA PROCEDURE NOTES
Epidural Block    Start time: 2/20/2024 6:14 PM  Reason for block: labor analgesia  Staffing  Performed: MIK   Authorized by: Lili Stone MD MPH    Performed by: MIK Flores    Preanesthetic Checklist  Completed: patient identified, IV checked, risks and benefits discussed, surgical consent, pre-op evaluation, timeout performed and sterile techniques followed  Block Timeout  RN/Licensed healthcare professional reads aloud to the Anesthesia provider and entire team: Patient identity, procedure with side and site, patient position, and as applicable the availability of implants/special equipment/special requirements.  Patient on coagulant treatment: no  Timeout performed at: 2/20/2024 6:14 PM  Block Placement  Patient position: sitting  Prep: ChloraPrep  Sterility prep: cap, drape, gloves, hand and mask  Sedation level: no sedation  Patient monitoring: blood pressure, continuous pulse oximetry and heart rate  Approach: midline  Local numbing: lidocaine 1% to skin and subcutaneous tissues  Vertebral space: lumbar  Lumbar location: L3-L4  Epidural  Loss of resistance technique: saline  Guidance: landmark technique        Needle  Needle type: Tuohy   Needle gauge: 17  Needle length: 8.9cm  Needle insertion depth: 5.5 cm  Catheter type: multi-orifice  Catheter size: 19 G  Catheter at skin depth: 10.5 cm  Catheter securement method: clear occlusive dressing and liquid medical adhesive    Test dose: lidocaine 1.5% with epinephrine 1-to-200,000  Test dose: lidocaine 1.5% with epinephrine 1-to-200,000  Test dose result: no positive test dose            Assessment  Events: no positive test dose  Procedure assessment: patient tolerated procedure well with no immediate complications

## 2024-02-20 NOTE — SIGNIFICANT EVENT
Feeling regular cx.     Cervical Exam  Dilation: 3  Effacement (%): 80  Fetal Station: -2  Presentation: Vertex (Per MD Matute Per BSUS)  Method: Manual  OB Examiner: MD Fani  Fetal Assessment  Movement: Present  Mode: External US  Baseline Fetal Heart Rate (bpm): 140 bpm  Baseline Classification: Normal  Variability: Moderate (Between 6 and 25 BPM)  Pattern: Accelerations  FHR Category: Category I      Contraction Frequency: 1.5-5    IOL  - now 3cm, defer next cyto/CRB attempt and plan to start pitocin in 1hr  - CEFM, cat 1 currently  - desires unmedicated birth     Yasmin Mohr MD PGY-4  Obstetrics and Gynecology

## 2024-02-20 NOTE — ANESTHESIA PREPROCEDURE EVALUATION
Patient: Carlota Garrido    Evaluation Method: In-person visit    Procedure Information    Date: 02/19/24  Procedure: Labor Consult         Relevant Problems   Cardiovascular   (+) Chronic hypertension affecting pregnancy      Endocrine (within normal limits)      GI (within normal limits)      Neuro/Psych (within normal limits)      Pulmonary (within normal limits)      GI/Hepatic (within normal limits)      Hematology (within normal limits)       Clinical information reviewed:   Tobacco  Allergies    Med Hx  Surg Hx   Fam Hx          NPO Detail:  No data recorded     OB/Gyn Evaluation    Present Pregnancy    Patient is pregnant now.   Obstetric History                Physical Exam    Airway  Mallampati: I  TM distance: >3 FB  Neck ROM: full     Cardiovascular - normal exam     Dental - normal exam     Pulmonary - normal exam     Abdominal - normal exam             Anesthesia Plan    History of general anesthesia?: yes  History of complications of general anesthesia?: no    ASA 2     general and epidural     Anesthetic plan and risks discussed with patient.    Plan discussed with attending.

## 2024-02-20 NOTE — PROGRESS NOTES
Intrapartum Progress Note    Assessment/Plan   Carlota Garrido is a 43 y.o.  at 39w1d. LUIS: 2024, by Last Menstrual Period.     Latent labor  -s/p AROM 0600  -continue pitocin per protocol  -plan for ncb, epidural available per patient request  -GBS neg  -CEFM cat 1 currently    cHTN  -Nifed 30  -HELLP labs neg  -asx  -normotensive     Maternal wellbeing  - 5 x 5 x 4 cm fibroid   - Hx of laparoscopic surgery w/umbilical endo    Pt seen and discussed with Dr. Judy Matute MD PGY-1      Principal Problem:    Labor and delivery, indication for care  Active Problems:    Chronic hypertension affecting pregnancy    Pregnancy Problems (from 24 to present)       Problem Noted Resolved    Chronic hypertension affecting pregnancy 2024 by Tina Olivia MD No    Priority:  Medium      Overview Signed 2024  3:52 PM by Jaqueline Matute MD     Nifed 30  Normotensive on admission  Asymptomatic  HELLP labs pending on admission         Labor and delivery, indication for care 2024 by Kait Jernigan MD No    Priority:  Medium      Overview Addendum 2024  3:53 PM by Jaqueline Matute MD     Admitted, consented, scanned - cephalic  IOL with crb and cyto  PNLs reviewed, wnl - PNC in [place] and with [facility] (records scanned into chart)  Abnl 1 hr, nl 3hr  GBS  neg  ppBC: declines                   Subjective   Pt starting to have uncomfortable ctx. Overall feeling well    Objective   Last Vitals:  Temp Pulse Resp BP MAP Pulse Ox   36.4 °C (97.5 °F) 74 18 132/82   98 %     Vitals Min/Max Last 24 Hours:  Temp  Min: 36.1 °C (97 °F)  Max: 36.9 °C (98.4 °F)  Pulse  Min: 69  Max: 88  Resp  Min: 16  Max: 18  BP  Min: 116/60  Max: 157/69    Intake/Output:  No intake or output data in the 24 hours ending 24 0840    Physical Examination:  Cervical Exam  Dilation: 4  Effacement (%): 80  Fetal Station: -2  Presentation: Vertex (Per MD Matute Per BSUS)  Method: Manual  OB Examiner:  MD Anthony  Fetal Assessment  Movement: Present  Mode: External US  Baseline Fetal Heart Rate (bpm): 130 bpm  Baseline Classification: Normal  Variability: Moderate (Between 6 and 25 BPM)  Pattern: Accelerations  FHR Category: Category I      Contraction Frequency: 1.5-5          Lab Review:  Labs in chart were reviewed.

## 2024-02-20 NOTE — PROGRESS NOTES
Intrapartum Progress Note    Assessment/Plan   Carlota Garrido is a 43 y.o.  at 39w0d. LUIS: 2024, by Last Menstrual Period admitted for IOL at term.    1. IOL  - Most recent SVE: /-2  - s/p unsuccessful CRB attempt x3. S/p cyto x1. [place] well. Will plan for one additional CRB attempt. For Cyto #2 when/if ctx space   - Will start pitocin and AROM when appropriate  - Delivery plan: patient desires vaginal delivery, patient counseled on risks of labor, vaginal delivery, and possibility of C/S for varying maternal or fetal indications    2. cHTN  - On Nifed 30mg   - Normotensive, asx  - HELLP labs neg; p:c pending     3. Maternal well-being  - Adm Hgb 11.1  - Nubain/epidural per patient request  - PNLs reviewed, wnl - PNC in [place] and with [facility] (records scanned into chart)  - Abnl 1 hr, nl 3hr  - GBS neg  - ppBC: declines   - 5 x 5 x 4 cm fibroid of unknown location- T&C 1U   - Hx of Leep     3. Fetal well-being  - CEFM; Cat 1 currently  - US : 2990g, 6.5# (43%ile), AC 68%ile     Seen & dw. Dr. Sunny Cruz MD  PGY-1, Obstetrics & Gynecology   [facility]     Principal Problem:    Labor and delivery, indication for care  Active Problems:    Chronic hypertension affecting pregnancy    Pregnancy Problems (from 24 to present)       Problem Noted Resolved    Chronic hypertension affecting pregnancy 2024 by Tina Olivia MD No    Priority:  Medium      Overview Signed 2024  3:52 PM by Jaqueline Matute MD     Nifed 30  Normotensive on admission  Asymptomatic  HELLP labs pending on admission         Labor and delivery, indication for care 2024 by Kait Jernigan MD No    Priority:  Medium      Overview Addendum 2024  3:53 PM by Jaqueline Matute MD     Admitted, consented, scanned - cephalic  IOL with crb and cyto  PNLs reviewed, wnl - PNC in [place] and with [facility] (records scanned into chart)  Abnl 1 hr, nl 3hr  GBS  neg  ppBC:  declines                   Subjective   Patient resting with support team at bedside. Not yet feeling strong contractions.     Objective   Last Vitals:  Temp Pulse Resp BP MAP Pulse Ox   36.6 °C (97.9 °F) 78 16 131/68   99 %     Vitals Min/Max Last 24 Hours:  Temp  Min: 36.6 °C (97.9 °F)  Max: 36.6 °C (97.9 °F)  Pulse  Min: 76  Max: 88  Resp  Min: 16  Max: 16  BP  Min: 116/60  Max: 134/79    Intake/Output:  No intake or output data in the 24 hours ending 02/19/24 1947    Physical Examination:  Genitourinary:      Deferred   Cardiovascular:      Rate and Rhythm: Normal rate.   Pulmonary:      Effort: Pulmonary effort is normal.      Breath sounds: Normal breath sounds.   Abdominal:      Palpations: Abdomen is soft.      Comments: Gravid, non-tender to palpation  Neurological:      General: No focal deficit present.      Mental Status: She is alert.   Skin:     General: Skin is warm and dry.   Psychiatric:         Mood and Affect: Mood normal.         Behavior: Behavior normal.         Lab Review:  Lab Results   Component Value Date    WBC 5.6 02/19/2024    HGB 11.1 (L) 02/19/2024    HCT 34.0 (L) 02/19/2024     02/19/2024

## 2024-02-20 NOTE — SIGNIFICANT EVENT
"Labor Check      S: Patient amenable to cervical exam and AROM         O:  /62   Pulse 76   Temp 36.8 °C (98.2 °F) (Tympanic)   Resp 18   Ht 1.575 m (5' 2\")   Wt 80.2 kg (176 lb 12.9 oz)   LMP 05/22/2023   SpO2 98%   BMI 32.34 kg/m²      SVE: 4/80/-2     NST:   Baseline: 130  Variability: mod  Accels: +  Decels: -     [place]: q2-5 min       A/P  - Latent Labor.   - CEFM Cat I currently  - Pitocin currently infusing. Increase per protocol  - s/p AROM, clr   - cHTN- continue Nifed 30mg  - Continue position changes, as tolerated  - GBS negative  - Recheck as clinically indicated by maternal or fetal status or PRN   - Anticipate NSVB.      Pt. d/w Fani Roque MD  PGY-1, Obstetrics & Gynecology   [facility]       "

## 2024-02-21 PROCEDURE — 2500000005 HC RX 250 GENERAL PHARMACY W/O HCPCS

## 2024-02-21 PROCEDURE — 7210000002 HC LABOR PER HOUR

## 2024-02-21 PROCEDURE — 2500000004 HC RX 250 GENERAL PHARMACY W/ HCPCS (ALT 636 FOR OP/ED): Performed by: STUDENT IN AN ORGANIZED HEALTH CARE EDUCATION/TRAINING PROGRAM

## 2024-02-21 PROCEDURE — 99222 1ST HOSP IP/OBS MODERATE 55: CPT | Performed by: STUDENT IN AN ORGANIZED HEALTH CARE EDUCATION/TRAINING PROGRAM

## 2024-02-21 PROCEDURE — 10H07YZ INSERTION OF OTHER DEVICE INTO PRODUCTS OF CONCEPTION, VIA NATURAL OR ARTIFICIAL OPENING: ICD-10-PCS

## 2024-02-21 PROCEDURE — 51701 INSERT BLADDER CATHETER: CPT

## 2024-02-21 PROCEDURE — 2500000002 HC RX 250 W HCPCS SELF ADMINISTERED DRUGS (ALT 637 FOR MEDICARE OP, ALT 636 FOR OP/ED): Performed by: STUDENT IN AN ORGANIZED HEALTH CARE EDUCATION/TRAINING PROGRAM

## 2024-02-21 PROCEDURE — 59409 OBSTETRICAL CARE: CPT | Performed by: OBSTETRICS & GYNECOLOGY

## 2024-02-21 PROCEDURE — 2500000005 HC RX 250 GENERAL PHARMACY W/O HCPCS: Performed by: ANESTHESIOLOGIST ASSISTANT

## 2024-02-21 PROCEDURE — 0HQ9XZZ REPAIR PERINEUM SKIN, EXTERNAL APPROACH: ICD-10-PCS | Performed by: OBSTETRICS & GYNECOLOGY

## 2024-02-21 PROCEDURE — 2500000004 HC RX 250 GENERAL PHARMACY W/ HCPCS (ALT 636 FOR OP/ED)

## 2024-02-21 PROCEDURE — 1100000001 HC PRIVATE ROOM DAILY

## 2024-02-21 RX ORDER — IBUPROFEN 600 MG/1
600 TABLET ORAL EVERY 6 HOURS SCHEDULED
Status: DISCONTINUED | OUTPATIENT
Start: 2024-02-22 | End: 2024-02-23 | Stop reason: HOSPADM

## 2024-02-21 RX ORDER — ACETAMINOPHEN 325 MG/1
975 TABLET ORAL EVERY 6 HOURS
Status: DISCONTINUED | OUTPATIENT
Start: 2024-02-22 | End: 2024-02-23 | Stop reason: HOSPADM

## 2024-02-21 RX ORDER — LIDOCAINE HYDROCHLORIDE 10 MG/ML
INJECTION INFILTRATION; PERINEURAL AS NEEDED
Status: DISCONTINUED | OUTPATIENT
Start: 2024-02-21 | End: 2024-02-21

## 2024-02-21 RX ORDER — FENTANYL CITRATE 50 UG/ML
INJECTION, SOLUTION INTRAMUSCULAR; INTRAVENOUS AS NEEDED
Status: DISCONTINUED | OUTPATIENT
Start: 2024-02-21 | End: 2024-02-21

## 2024-02-21 RX ORDER — ACETAMINOPHEN 325 MG/1
650 TABLET ORAL EVERY 6 HOURS PRN
Status: DISCONTINUED | OUTPATIENT
Start: 2024-02-21 | End: 2024-02-23 | Stop reason: HOSPADM

## 2024-02-21 RX ADMIN — FENTANYL CITRATE 100 MCG: 50 INJECTION, SOLUTION INTRAMUSCULAR; INTRAVENOUS at 15:52

## 2024-02-21 RX ADMIN — Medication 60 MILLI-UNITS/MIN: at 22:17

## 2024-02-21 RX ADMIN — SODIUM CHLORIDE, POTASSIUM CHLORIDE, SODIUM LACTATE AND CALCIUM CHLORIDE 500 ML: 600; 310; 30; 20 INJECTION, SOLUTION INTRAVENOUS at 19:28

## 2024-02-21 RX ADMIN — Medication 14 ML/HR: at 04:21

## 2024-02-21 RX ADMIN — LIDOCAINE HYDROCHLORIDE 5 ML: 10 INJECTION, SOLUTION INFILTRATION; PERINEURAL at 10:09

## 2024-02-21 RX ADMIN — Medication 30 MILLI-UNITS/MIN: at 15:21

## 2024-02-21 RX ADMIN — NIFEDIPINE 30 MG: 30 TABLET, FILM COATED, EXTENDED RELEASE ORAL at 07:23

## 2024-02-21 RX ADMIN — SODIUM CHLORIDE, POTASSIUM CHLORIDE, SODIUM LACTATE AND CALCIUM CHLORIDE 125 ML/HR: 600; 310; 30; 20 INJECTION, SOLUTION INTRAVENOUS at 00:51

## 2024-02-21 RX ADMIN — Medication 14 ML/HR: at 10:50

## 2024-02-21 ASSESSMENT — PAIN SCALES - GENERAL
PAINLEVEL_OUTOF10: 0 - NO PAIN
PAINLEVEL_OUTOF10: 5 - MODERATE PAIN
PAINLEVEL_OUTOF10: 0 - NO PAIN

## 2024-02-21 NOTE — PROGRESS NOTES
Intrapartum Progress Note    Assessment/Plan   Carlota Garrido is a 43 y.o.  at 39w2d. LUIS: 2024, by Last Menstrual Period, undergoing labor induction for chronic hypertension.    Labor induction  - now in active labor and s/p amniotomy at 1400   - continue pit per [facility] protocol    IUP  - fht category 2 for occasional late decelerations, otherwise reassuring with moderate variability throughout  - continue CEFM for maternal medical condition, pit, epidural    GBS negative    cHTN  - continue home nifedipine 30 mg  - HELLP labs on admission negative    Marilynn Walton MD       Principal Problem:    Labor and delivery, indication for care  Active Problems:    Chronic hypertension affecting pregnancy    Pregnancy Problems (from 24 to present)       Problem Noted Resolved    Chronic hypertension affecting pregnancy 2024 by Tina Olivia MD No    Priority:  Medium      Overview Signed 2024  3:52 PM by Jaqueline Matute MD     Nifed 30  Normotensive on admission  Asymptomatic  HELLP labs pending on admission         Labor and delivery, indication for care 2024 by Kait Jernigan MD No    Priority:  Medium      Overview Addendum 2024  3:53 PM by Jaqueline Matute MD     Admitted, consented, scanned - cephalic  IOL with crb and cyto  PNLs reviewed, wnl - PNC in [place] and with [facility] (records scanned into chart)  Abnl 1 hr, nl 3hr  GBS  neg  ppBC: declines                   Subjective   Starting to feel some discomfort again though epidural is infusing.  Anesthesiology en route to assess.  Feeling pressure anteriorly in lower abdomen.    Objective   Last Vitals:  Temp Pulse Resp BP MAP Pulse Ox   37.3 °C (99.1 °F) 77 18 129/76   97 %     Vitals Min/Max Last 24 Hours:  Temp  Min: 36 °C (96.8 °F)  Max: 37.4 °C (99.3 °F)  Pulse  Min: 71  Max: 97  Resp  Min: 17  Max: 19  BP  Min: 104/55  Max: 182/96    Intake/Output:    Intake/Output Summary (Last 24 hours) at 2024 1221  Last data filed  at 2/21/2024 1130  Gross per 24 hour   Intake 130.43 ml   Output 2150 ml   Net -2019.57 ml       Physical Examination:  GENERAL: Examination reveals a well developed, well nourished, gravid female in no acute distress. She is alert and cooperative.  HEENT: External ears normal. Nose normal, no erythema or discharge. Mouth and throat clear.  NECK:  supple, no injury  LUNGS:  normal respiratory effort  FHR is 145 BPM with moderate variability, accelerations absent, one late deceleration present in the last 30 minutes  [place] reading: ctx q 2 mins   CERVIX:  6/90/0 ; MEMBRANES are  (leaking)  EXTREMITIES: no redness or tenderness in the calves or thighs, no edema  SKIN: normal coloration and turgor, no rashes  NEUROLOGICAL: alert, oriented, normal speech, no focal findings or movement disorder noted  PSYCHOLOGICAL: awake and alert; oriented to person, place, and time    Lab Review:  Lab Results   Component Value Date    WBC 5.6 02/19/2024    HGB 11.1 (L) 02/19/2024    HCT 34.0 (L) 02/19/2024     02/19/2024

## 2024-02-21 NOTE — SIGNIFICANT EVENT
"Labor Progress Note    Subjective:   Patient resting comfortably in bed    Objective:  Vitals:  /67   Pulse 86   Temp 37.1 °C (98.8 °F)   Resp 19   Ht 1.575 m (5' 2\")   Wt 80.2 kg (176 lb 12.9 oz)   LMP 05/22/2023   SpO2 96%   BMI 32.34 kg/m²   Cervical Exam  Dilation: 9  Effacement (%): 90  Fetal Station: 1  Presentation: Vertex (Per MD Matute Per BSUS)  Method: Manual  OB Examiner: MD Giovani.  Fetal Assessment  Movement: Present  Mode: External US  Baseline Fetal Heart Rate (bpm): 155 bpm  Baseline Classification: Normal  Variability: Moderate (Between 6 and 25 BPM)  Pattern: Accelerations  FHR Category: Category I      Contraction Frequency: 1.5-2.5      A/P:    Active labor  - Titrate pitocin per protocol, currently at 30  - IUPC in place  - Epidural infusing  - CEFM; Cat 1 currently  - GBS neg, no ppx    Seen and discussed with Dr. Isabelle Matute MD PGY-1      Discussed with     "

## 2024-02-21 NOTE — HOSPITAL COURSE
44yo   Maternal PNC in [place] and [facility] (records scanned into chart)  AMA s/p rr cffDNA  PMH/preg complicated by: cHTN on Nifedipine, uterine fibroid, AMA s/p rr cfDNA  Meds: ASA, Fe, Nifedipine  PNS normal except abnormal 1hr GTT but normal 3hr, GBS neg  Adm syphilis negative  O+ ab-  Ultrasounds: anatomic ultrasound could not visualize spine and left fingers; following ultrasounds normal but spine could still not be completed; cephalic

## 2024-02-21 NOTE — SIGNIFICANT EVENT
Labor progress:  Provider to bedside for repeat cervical exam for patient in active labor.  She reports better pain control since epidural re-dosing - is aware of contractions but they are not painful.    Fht 145 BPM, minimal variability, no accelerations or decelerations present  [place] - ctx q 2 mins  Sve - 7/90/0    - Active labor, continue pit infusion, plan to recheck in 2 hours, anticipate SVB  - cHTN, continue nifed 30  - FHT category 2 for minimal variability, CEFARMIDA Walton MD

## 2024-02-21 NOTE — SIGNIFICANT EVENT
"Labor Check      S: Amenable to cervical exam         O:  /61   Pulse 85   Temp 36.8 °C (98.2 °F) (Temporal)   Resp 18   Ht 1.575 m (5' 2\")   Wt 80.2 kg (176 lb 12.9 oz)   LMP 05/22/2023   SpO2 96%   BMI 32.34 kg/m²      SVE: 5/80/0     NST  Baseline: 145  Variability: mod  Accels: +  Decels: -     [place]: q5 min       A/P  - Latent Labor.   - CEFM Cat I currently  - Pitocin currently infusing. Increase per protocol  - Continue position changes, as tolerated  - GBS negative  - Recheck as clinically indicated by maternal or fetal status or PRN   - Anticipate NSVB.      Pt. d/w Dr. Fani Cruz MD  PGY-1, Obstetrics & Gynecology   [facility]       "

## 2024-02-21 NOTE — SIGNIFICANT EVENT
"Labor Progress Note    Subjective:   Patient feeling increased pressure    Objective:  Vitals:  BP (!) 142/78   Pulse 86   Temp 36.9 °C (98.4 °F)   Resp 17   Ht 1.575 m (5' 2\")   Wt 80.2 kg (176 lb 12.9 oz)   LMP 05/22/2023   SpO2 98%   BMI 32.34 kg/m²   Cervical Exam  Dilation: 7  Effacement (%): 90  Fetal Station: 0  Presentation: Vertex (Per MD Matute Per BSUS)  Method: Manual  OB Examiner: MD Isabelle.  Fetal Assessment  Movement: Present  Mode: External US  Baseline Fetal Heart Rate (bpm): 150 bpm  Baseline Classification: Normal  Variability: Moderate (Between 6 and 25 BPM)  Pattern: Early decelerations  FHR Category: Category I      Contraction Frequency: 1.5-3.5          A/P:    Active labor  - Titrate pitocin per protocol, currently at 30  - patient uncomfortable with pressure of SVE, will reposition and place IUPC shortly  - Epidural infusing  - CEFM; Cat 1 currently  - GBS neg    Discussed with Dr. Isabelle Matute MD PGY-1      "

## 2024-02-21 NOTE — CARE PLAN
The patient's goals for the shift include  continue progressing through labor with successful dilation.    The clinical goals for the shift include FHR remains reassuring throughout the labor process    Over the shift, the patient did   Problem: Vaginal Birth or  Section  Goal: Fetal and maternal status remain reassuring during the birth process  Outcome: Progressing     Problem: Vaginal Birth or  Section  Goal: Minimal s/sx of HDP and BP<160/110  Outcome: Progressing     Problem: Skin  Goal: Participates in plan/prevention/treatment measures  Outcome: Progressing  Goal: Prevent/manage excess moisture  Outcome: Progressing  Goal: Prevent/minimize sheer/friction injuries  Outcome: Progressing  Goal: Promote skin healing  Outcome: Progressing

## 2024-02-21 NOTE — PROGRESS NOTES
Intrapartum Progress Note    Assessment/Plan   Carlota Garrido is a 43 y.o.  at 39w1d. LUIS: 2024, by Last Menstrual Period. She is admitted for IOL     1. IOL  - Most recent SVE:  0   - Pit started at 0330  - s/p AROM at 1400  - Delivery plan: patient desires vaginal delivery, patient counseled on risks of labor, vaginal delivery, and possibility of C/S for varying maternal or fetal indications     2. cHTN  - On Nifed 30mg   - Normotensive, asx  - HELLP labs neg; p:c pending      3. Maternal well-being  - Adm Hgb 11.1  - Epidural infusing   - PNLs reviewed, wnl - PNC in [place] and with [facility] (records scanned into chart)  - Abnl 1 hr, nl 3hr  - GBS neg  - ppBC: declines   - Hx of laparoscopic surgery for endo at umbilicus   - 5 x 5 x 4 cm fibroid of unknown location- T&C 1U   - Hx of LEEP      4.  Fetal well-being  - CEFM; Cat 1 currently  - US : 2990g, 6.5# (43%ile), AC 68%ile     Seen & dw. Dr. Tristan Cruz MD  PGY-1, Obstetrics & Gynecology   [facility]        Principal Problem:    Labor and delivery, indication for care  Active Problems:    Chronic hypertension affecting pregnancy    Pregnancy Problems (from 24 to present)       Problem Noted Resolved    Chronic hypertension affecting pregnancy 2024 by Tina Olivia MD No    Priority:  Medium      Overview Signed 2024  3:52 PM by Jaqueline Matute MD     Nifed 30  Normotensive on admission  Asymptomatic  HELLP labs pending on admission         Labor and delivery, indication for care 2024 by Kait Jernigan MD No    Priority:  Medium      Overview Addendum 2024  3:53 PM by Jaqueline Matute MD     Admitted, consented, scanned - cephalic  IOL with crb and cyto  PNLs reviewed, wnl - PNC in [place] and with [facility] (records scanned into chart)  Abnl 1 hr, nl 3hr  GBS  neg  ppBC: declines                   Subjective   Patient now with epidural. Amenable to cervical exam.      Objective   Last Vitals:  Temp Pulse Resp BP MAP Pulse Ox   36.6 °C (97.9 °F) 75 18 121/58   97 %     Vitals Min/Max Last 24 Hours:  Temp  Min: 36 °C (96.8 °F)  Max: 36.9 °C (98.4 °F)  Pulse  Min: 69  Max: 93  Resp  Min: 18  Max: 19  BP  Min: 104/55  Max: 157/69    Intake/Output:    Intake/Output Summary (Last 24 hours) at 2/20/2024 2304  Last data filed at 2/20/2024 2230  Gross per 24 hour   Intake --   Output 400 ml   Net -400 ml       Physical Examination:  Genitourinary:      Vulva normal.   Cardiovascular:      Rate and Rhythm: Normal rate.   Pulmonary:      Effort: Pulmonary effort is normal.      Breath sounds: Normal breath sounds.   Abdominal:      Palpations: Abdomen is soft.      Comments: Gravid, non-tender to palpation  Neurological:      General: No focal deficit present.      Mental Status: She is alert.   Skin:     General: Skin is warm and dry.   Psychiatric:         Mood and Affect: Mood normal.         Behavior: Behavior normal.       SVE: 4/80/0    Lab Review:  Labs in chart were reviewed.

## 2024-02-21 NOTE — SIGNIFICANT EVENT
"Labor Check      S: FHT with recurrent decels. HO to bedside.         O:  /58   Pulse 91   Temp 36.7 °C (98.1 °F) (Temporal)   Resp 18   Ht 1.575 m (5' 2\")   Wt 80.2 kg (176 lb 12.9 oz)   LMP 05/22/2023   SpO2 99%   BMI 32.34 kg/m²      SVE: 5/80/0     NST  Baseline: 145  Variability: mod  Accels: +  Decels: recurrent decels, improving     [place]: q2-4 min       A/P  - Latent Labor.   - CEFM Cat III currently, transitioning now to Cat I   - Pit now paused. Bolus infusing. Currently on hands and knees with improvement in tracing   - Continue position changes, as tolerated  - Terb on standby  - GBS negative  - Recheck as clinically indicated by maternal or fetal status or PRN   - Anticipate NSVB.      Pt. d/w Dr. Waleska Cruz MD  PGY-1, Obstetrics & Gynecology   [facility]       "

## 2024-02-22 LAB
ABO GROUP (TYPE) IN BLOOD: NORMAL
ANTIBODY SCREEN: NORMAL
RH FACTOR (ANTIGEN D): NORMAL

## 2024-02-22 PROCEDURE — 2500000001 HC RX 250 WO HCPCS SELF ADMINISTERED DRUGS (ALT 637 FOR MEDICARE OP)

## 2024-02-22 PROCEDURE — 36415 COLL VENOUS BLD VENIPUNCTURE: CPT

## 2024-02-22 PROCEDURE — 2500000002 HC RX 250 W HCPCS SELF ADMINISTERED DRUGS (ALT 637 FOR MEDICARE OP, ALT 636 FOR OP/ED)

## 2024-02-22 PROCEDURE — 2500000001 HC RX 250 WO HCPCS SELF ADMINISTERED DRUGS (ALT 637 FOR MEDICARE OP): Performed by: OBSTETRICS & GYNECOLOGY

## 2024-02-22 PROCEDURE — 1100000001 HC PRIVATE ROOM DAILY

## 2024-02-22 PROCEDURE — 88307 TISSUE EXAM BY PATHOLOGIST: CPT | Mod: TC,SUR

## 2024-02-22 PROCEDURE — 86850 RBC ANTIBODY SCREEN: CPT

## 2024-02-22 PROCEDURE — 88307 TISSUE EXAM BY PATHOLOGIST: CPT | Performed by: PATHOLOGY

## 2024-02-22 PROCEDURE — 2500000005 HC RX 250 GENERAL PHARMACY W/O HCPCS

## 2024-02-22 PROCEDURE — 59409 OBSTETRICAL CARE: CPT

## 2024-02-22 RX ORDER — NIFEDIPINE 10 MG/1
10 CAPSULE ORAL ONCE AS NEEDED
Status: DISCONTINUED | OUTPATIENT
Start: 2024-02-22 | End: 2024-02-23 | Stop reason: HOSPADM

## 2024-02-22 RX ORDER — METHYLERGONOVINE MALEATE 0.2 MG/ML
0.2 INJECTION INTRAVENOUS ONCE AS NEEDED
Status: DISCONTINUED | OUTPATIENT
Start: 2024-02-22 | End: 2024-02-23 | Stop reason: HOSPADM

## 2024-02-22 RX ORDER — SIMETHICONE 80 MG
80 TABLET,CHEWABLE ORAL 4 TIMES DAILY PRN
Status: DISCONTINUED | OUTPATIENT
Start: 2024-02-22 | End: 2024-02-23 | Stop reason: HOSPADM

## 2024-02-22 RX ORDER — MISOPROSTOL 200 UG/1
800 TABLET ORAL ONCE AS NEEDED
Status: DISCONTINUED | OUTPATIENT
Start: 2024-02-22 | End: 2024-02-23 | Stop reason: HOSPADM

## 2024-02-22 RX ORDER — BISACODYL 10 MG/1
10 SUPPOSITORY RECTAL DAILY PRN
Status: DISCONTINUED | OUTPATIENT
Start: 2024-02-22 | End: 2024-02-23 | Stop reason: HOSPADM

## 2024-02-22 RX ORDER — OXYTOCIN/0.9 % SODIUM CHLORIDE 30/500 ML
60 PLASTIC BAG, INJECTION (ML) INTRAVENOUS ONCE AS NEEDED
Status: DISCONTINUED | OUTPATIENT
Start: 2024-02-22 | End: 2024-02-23 | Stop reason: HOSPADM

## 2024-02-22 RX ORDER — CARBOPROST TROMETHAMINE 250 UG/ML
250 INJECTION, SOLUTION INTRAMUSCULAR ONCE AS NEEDED
Status: DISCONTINUED | OUTPATIENT
Start: 2024-02-22 | End: 2024-02-23 | Stop reason: HOSPADM

## 2024-02-22 RX ORDER — HYDRALAZINE HYDROCHLORIDE 20 MG/ML
5 INJECTION INTRAMUSCULAR; INTRAVENOUS ONCE AS NEEDED
Status: DISCONTINUED | OUTPATIENT
Start: 2024-02-22 | End: 2024-02-23 | Stop reason: HOSPADM

## 2024-02-22 RX ORDER — OXYTOCIN 10 [USP'U]/ML
10 INJECTION, SOLUTION INTRAMUSCULAR; INTRAVENOUS ONCE AS NEEDED
Status: DISCONTINUED | OUTPATIENT
Start: 2024-02-22 | End: 2024-02-23 | Stop reason: HOSPADM

## 2024-02-22 RX ORDER — LABETALOL HYDROCHLORIDE 5 MG/ML
20 INJECTION, SOLUTION INTRAVENOUS ONCE AS NEEDED
Status: DISCONTINUED | OUTPATIENT
Start: 2024-02-22 | End: 2024-02-23 | Stop reason: HOSPADM

## 2024-02-22 RX ORDER — IBUPROFEN 600 MG/1
600 TABLET ORAL EVERY 6 HOURS
Status: DISCONTINUED | OUTPATIENT
Start: 2024-02-22 | End: 2024-02-23 | Stop reason: HOSPADM

## 2024-02-22 RX ORDER — DIPHENHYDRAMINE HCL 25 MG
25 CAPSULE ORAL EVERY 6 HOURS PRN
Status: DISCONTINUED | OUTPATIENT
Start: 2024-02-22 | End: 2024-02-23 | Stop reason: HOSPADM

## 2024-02-22 RX ORDER — LIDOCAINE 560 MG/1
1 PATCH PERCUTANEOUS; TOPICAL; TRANSDERMAL
Status: DISCONTINUED | OUTPATIENT
Start: 2024-02-22 | End: 2024-02-23 | Stop reason: HOSPADM

## 2024-02-22 RX ORDER — ONDANSETRON HYDROCHLORIDE 2 MG/ML
4 INJECTION, SOLUTION INTRAVENOUS EVERY 6 HOURS PRN
Status: DISCONTINUED | OUTPATIENT
Start: 2024-02-22 | End: 2024-02-23 | Stop reason: HOSPADM

## 2024-02-22 RX ORDER — DIPHENHYDRAMINE HYDROCHLORIDE 50 MG/ML
25 INJECTION INTRAMUSCULAR; INTRAVENOUS EVERY 6 HOURS PRN
Status: DISCONTINUED | OUTPATIENT
Start: 2024-02-22 | End: 2024-02-23 | Stop reason: HOSPADM

## 2024-02-22 RX ORDER — ONDANSETRON 4 MG/1
4 TABLET, FILM COATED ORAL EVERY 6 HOURS PRN
Status: DISCONTINUED | OUTPATIENT
Start: 2024-02-22 | End: 2024-02-23 | Stop reason: HOSPADM

## 2024-02-22 RX ORDER — LOPERAMIDE HYDROCHLORIDE 2 MG/1
4 CAPSULE ORAL EVERY 2 HOUR PRN
Status: DISCONTINUED | OUTPATIENT
Start: 2024-02-22 | End: 2024-02-23 | Stop reason: HOSPADM

## 2024-02-22 RX ORDER — TRANEXAMIC ACID 100 MG/ML
1000 INJECTION, SOLUTION INTRAVENOUS ONCE AS NEEDED
Status: DISCONTINUED | OUTPATIENT
Start: 2024-02-22 | End: 2024-02-23 | Stop reason: HOSPADM

## 2024-02-22 RX ORDER — POLYETHYLENE GLYCOL 3350 17 G/17G
17 POWDER, FOR SOLUTION ORAL 2 TIMES DAILY PRN
Status: DISCONTINUED | OUTPATIENT
Start: 2024-02-22 | End: 2024-02-23 | Stop reason: HOSPADM

## 2024-02-22 RX ORDER — ADHESIVE BANDAGE
30 BANDAGE TOPICAL
Status: DISCONTINUED | OUTPATIENT
Start: 2024-02-22 | End: 2024-02-23 | Stop reason: HOSPADM

## 2024-02-22 RX ADMIN — ACETAMINOPHEN 975 MG: 325 TABLET ORAL at 06:35

## 2024-02-22 RX ADMIN — IBUPROFEN 600 MG: 600 TABLET, FILM COATED ORAL at 00:28

## 2024-02-22 RX ADMIN — IBUPROFEN 600 MG: 600 TABLET, FILM COATED ORAL at 18:34

## 2024-02-22 RX ADMIN — BENZOCAINE AND LEVOMENTHOL 1 APPLICATION: 200; 5 SPRAY TOPICAL at 06:35

## 2024-02-22 RX ADMIN — IBUPROFEN 600 MG: 600 TABLET, FILM COATED ORAL at 12:24

## 2024-02-22 RX ADMIN — NIFEDIPINE 30 MG: 30 TABLET, FILM COATED, EXTENDED RELEASE ORAL at 06:36

## 2024-02-22 RX ADMIN — ACETAMINOPHEN 975 MG: 325 TABLET ORAL at 12:24

## 2024-02-22 RX ADMIN — Medication 1 EACH: at 06:35

## 2024-02-22 RX ADMIN — ACETAMINOPHEN 975 MG: 325 TABLET ORAL at 18:34

## 2024-02-22 RX ADMIN — IBUPROFEN 600 MG: 600 TABLET, FILM COATED ORAL at 06:35

## 2024-02-22 RX ADMIN — ACETAMINOPHEN 975 MG: 325 TABLET ORAL at 00:28

## 2024-02-22 ASSESSMENT — PAIN SCALES - GENERAL
PAINLEVEL_OUTOF10: 1
PAINLEVEL_OUTOF10: 5 - MODERATE PAIN
PAINLEVEL_OUTOF10: 0 - NO PAIN
PAINLEVEL_OUTOF10: 0 - NO PAIN
PAINLEVEL_OUTOF10: 3
PAINLEVEL_OUTOF10: 0 - NO PAIN

## 2024-02-22 ASSESSMENT — PAIN - FUNCTIONAL ASSESSMENT
PAIN_FUNCTIONAL_ASSESSMENT: 0-10

## 2024-02-22 ASSESSMENT — PAIN DESCRIPTION - DESCRIPTORS: DESCRIPTORS: CRAMPING

## 2024-02-22 NOTE — LACTATION NOTE
"Lactation Consultant Note  Lactation Consultation  Reason for Consult: Initial assessment  Consultant Name: TIRSO Zapata IBCLC    Maternal Information  Has mother  before?: No  Infant to breast within first 2 hours of birth?: Yes  Exclusive Pump and Bottle Feed: No    Maternal Assessment  Breast Assessment: Medium, Symmetrical, Soft, Compressible  Nipple Assessment: Intact, Erect  [place] Assessment: Normal    Infant Assessment  Infant Behavior: Quiet alert, Readiness to feed, Feeding cues observed, Rooting response  Infant Assessment: Other (Comment) (deferred)    Feeding Assessment  Nutrition Source: Breastmilk  Feeding Method: Nursing at the breast  Feeding Position: Skin to skin  Latch Assessment: No latch achieved    LATCH TOOL       Breast Pump       Other OB Lactation Tools       Patient Follow-up  Inpatient Lactation Follow-up Needed : Yes    Other OB Lactation Documentation  Maternal Risk Factors: Age over 30, primiparity, Hypertension  Infant Risk Factors: Early term birth 37-39 weeks    Recommendations/Summary    Here to assist this first-time breastfeeding mother. The mother reports that infant latches well to the left breast. She has been unable to latch to the right side. I offered assistance with latching her infant to the right side. The infant was placed skin to skin with his mother and soon began rooting and demonstrating feeding cues. I suggested a right cross-cradle hold for latching. We reviewed correct infant body alignment, proper head/breast support, and positioning the infant's nose opposite the maternal nipple. The infant made a few attempts to latch, but his mouth was not open wide enough. In response, the mother tried to bring the breast to the infant and put the nipple in the mother's mouth. When I explained that the infant needed to open his mouth wider, the mother stated that she did not understand why she could not \"help\" him. She stated that the infant latches well to the " "left side. I then suggested that the mother latch her infant to the left side. She then put him to the breast on the left side using a cradle hold. He was not well-supported or aligned and his latch was shallow and brief. I brought a breastfeeding booklet to the room to show the mother an example of an effective latch and she responded, \"I know.\" I spoke with the mother about early  feeding patterns and behaviors, especially in the first 24 hours of life; frequent feeds with goal of 8-12 feeds/24 hours; early milk production; the importance of a deep latch for maternal comfort and optimal milk production/transfer; and the benefits of skin to skin for breastfeeding. The mother does not have a breast pump for home use. A order for a personal use breast pump was placed to [facility]. The mother was given written information on outpatient lactation services. The mother was offered further assistance with latching, if desired. The mother is asked to call.  "

## 2024-02-22 NOTE — CARE PLAN
The patient's goals for the shift include      The clinical goals for the shift include FHR remains reassuring throughout the labor process    Patient transferred to [facility]. Report given to TUSHAR Leiva.

## 2024-02-22 NOTE — ANESTHESIA POSTPROCEDURE EVALUATION
Patient: Carlota Garrido is a 43 y.o., , who had a Vaginal, Spontaneous  delivery on 2024  at 39w2d and is now POD1.    She had Neuraxial Anesthesia without immediate complications noted.       Pain well controlled    Vitals:    24 0812   BP: 103/65   Pulse: 63   Resp: 16   Temp: 36.5 °C (97.7 °F)   SpO2: 96%       Neuraxial site assessed. No visible redness or swelling or drainage. Patient able to ambulate and move all extremities without difficulty. Able to void. No complaints of nausea/vomiting. Tolerating PO intake well. No s/sx of PDPH.     Anesthesia will sign off     Benny Hammond MD       Procedure Summary       Date: 24 Room / Location:     Anesthesia Start:  Anesthesia Stop: 24    Procedure: Labor Analgesia Diagnosis:     Scheduled Providers:  Responsible Provider: Lili Stone MD MPH    Anesthesia Type: general, epidural ASA Status: 2            Anesthesia Type: general, epidural    Vitals Value Taken Time   /65 24 0812   Temp 36.5 °C (97.7 °F) 24 08   Pulse 63 24 0812   Resp 16 24 0812   SpO2 96 % 24 08       Anesthesia Post Evaluation    Patient location during evaluation: floor  Patient participation: complete - patient participated  Level of consciousness: awake and alert  Pain management: adequate  Airway patency: patent  Cardiovascular status: acceptable  Respiratory status: acceptable  Hydration status: acceptable  Postoperative Nausea and Vomiting: none        No notable events documented.

## 2024-02-22 NOTE — L&D DELIVERY NOTE
OB Delivery Note  2024  Carlota Garrido  43 y.o.   Vaginal, Spontaneous        Gestational Age: 39w2d  /Para:   Quantitative Blood Loss: Admission to Discharge: 356 mL (2024  1:06 PM - 2024  4:28 AM)    Aneudy Garrido [[id number]]      Labor Events    Rupture date/time: 2024 0558  Rupture type: Artificial  Fluid color: Clear  Fluid odor: None  Labor type: Induced Onset of Labor  Labor allowed to proceed with plans for an attempted vaginal birth?: Yes  Induction: Misoprostol  First cervical ripening date/time: 2024  Induction date/time: 2024  Induction indications: Risk Reducing  Complications: None       Labor Event Times    Labor onset date/time: 2024  Dilation complete date/time: 2024  Start pushing date/time: 2024       Labor Length    1st stage: 29h 33m  2nd stage: 0h 26m  3rd stage: 0h 07m       Placenta    Placenta delivery date/time: 2024  Placenta removal: Spontaneous  Placenta appearance: Intact  Placenta disposition: pathology       Cord    Vessels: 3 vessels  Complications: Nuchal  Nuchal cord description: tight nuchal cord  Number of loops: 1  Delayed cord clamping?: Yes  Cord clamped date/time: 2024  Cord blood disposition: Lab  Gases sent?: No  Stem cell collection (by provider): No       Lacerations    Episiotomy: None  Perineal laceration: 1st  Perineal laceration repaired?: Yes  Periurethral laceration?: Yes  Periurethral laceration location: bilateral  Periurethral laceration repaired?: No       Anesthesia    Method: Epidural       Operative Delivery    Forceps attempted?: No  Vacuum extractor attempted?: No       Shoulder Dystocia    Shoulder dystocia present?: No        Delivery    Time head delivered: 2024 22:09:00  Birth date/time: 2024 22:09:00  Delivery type: Vaginal, Spontaneous  Complications: None       Resuscitation    Method: Suctioning, Tactile  stimulation       Apgars    Living status: Living  Apgar Component Scores:  1 min.:  5 min.:  10 min.:  15 min.:  20 min.:    Skin color:  0  1       Heart rate:  2  2       Reflex irritability:  2  2       Muscle tone:  2  2       Respiratory effort:  2  2       Total:  8  9       Apgars assigned by: ARMIDA SUE RN       Delivery Providers    Delivering clinician: Jb Kelly,    Provider Role    Gabriela Resendiz RN Delivery Nurse    Darlene Sue, RN Nursery Nurse    Angi Cruz MD Resident                 Dr. Kelly was present for the entirety of the delivery. Dr. Man supervised the repair.     VEDA Cruz MD  PGY-1, Obstetrics & Gynecology   [facility]

## 2024-02-22 NOTE — CARE PLAN
The patient's goals for the shift include  assistance with breastfeeding     The clinical goals for the shift include Bps < 160/110

## 2024-02-22 NOTE — DISCHARGE INSTRUCTIONS

## 2024-02-22 NOTE — PROGRESS NOTES
Intrapartum Progress Note    Assessment/Plan   Carlota Garrido is a 43 y.o.  at 39w2d. LUIS: 2024, by Last Menstrual Period. She is admitted for IOL in s/o cHTN    1. IOL  - Most recent SVE:  ; unchanged from prior exam at 1630  - Pit started at 0330  - s/p AROM at 1400  - Will plan for 2h recheck  - Delivery plan: patient desires vaginal delivery, patient counseled on risks of labor, vaginal delivery, and possibility of C/S for varying maternal or fetal indications     2. cHTN  - On Nifed 30mg   - Normotensive, asx  - HELLP labs neg; p:c 0.13     3. Maternal well-being  - Adm Hgb 11.1  - Epidural infusing   - PNLs reviewed, wnl - PNC in [place] and with [facility] (records scanned into chart)  - Abnl 1 hr, nl 3hr  - GBS neg  - ppBC: declines   - Hx of laparoscopic surgery for endo at umbilicus   - 5 x 5 x 4 cm fibroid of unknown location- T&C 1U   - Hx of LEEP      4.  Fetal well-being  - CEFM; Cat 1 currently  - US : 2990g, 6.5# (43%ile), AC 68%ile       To be seen & w. Dr. Leticia Cruz MD  PGY-1, Obstetrics & Gynecology   [facility]     Principal Problem:    Labor and delivery, indication for care  Active Problems:    Chronic hypertension affecting pregnancy    Pregnancy Problems (from 24 to present)       Problem Noted Resolved    Chronic hypertension affecting pregnancy 2024 by Tina Olivia MD No    Priority:  Medium      Overview Signed 2024  3:52 PM by Jaqueline Matute MD     Nifed 30  Normotensive on admission  Asymptomatic  HELLP labs pending on admission         Labor and delivery, indication for care 2024 by Kait Jernigan MD No    Priority:  Medium      Overview Addendum 2024  3:53 PM by Jaqueline Matute MD     Admitted, consented, scanned - cephalic  IOL with crb and cyto  PNLs reviewed, wnl - PNC in [place] and with [facility] (records scanned into chart)  Abnl 1 hr, nl 3hr  GBS  neg  ppBC: declines                   Subjective    Patient in active labor. Amenable to cervical exam    Objective   Last Vitals:  Temp Pulse Resp BP MAP Pulse Ox   37 °C (98.6 °F) 93 18 124/71   96 %     Vitals Min/Max Last 24 Hours:  Temp  Min: 36.4 °C (97.5 °F)  Max: 37.5 °C (99.5 °F)  Pulse  Min: 71  Max: 99  Resp  Min: 17  Max: 19  BP  Min: 104/55  Max: 182/96    Intake/Output:    Intake/Output Summary (Last 24 hours) at 2/21/2024 1941  Last data filed at 2/21/2024 1130  Gross per 24 hour   Intake 130.43 ml   Output 2150 ml   Net -2019.57 ml       Physical Examination:  Genitourinary:      Vulva normal.   Cardiovascular:      Rate and Rhythm: Normal rate.   Pulmonary:      Effort: Pulmonary effort is normal.      Breath sounds: Normal breath sounds.   Abdominal:      Palpations: Abdomen is soft.      Comments: Gravid, non-tender to palpation  Neurological:      General: No focal deficit present.      Mental Status: She is alert.   Skin:     General: Skin is warm and dry.   Psychiatric:         Mood and Affect: Mood normal.         Behavior: Behavior normal.       SVE: 9/90/1    Lab Review:  Lab Results   Component Value Date    WBC 5.6 02/19/2024    HGB 11.1 (L) 02/19/2024    HCT 34.0 (L) 02/19/2024     02/19/2024

## 2024-02-22 NOTE — PROGRESS NOTES
Postpartum Progress Note    Assessment/Plan   Carlota Garrido is a 43 y.o., , who delivered at 39w2d gestation and is now postpartum day 1.  - cHTN,  Nifed 30- HELLP labs neg, p:c 0.13, (prepregnancy was on amolodipine 5mg)  asx     #Postpartum  - continue routine postpartum care  - pain well controlled on po medications  - dvt risk score  4      #Maternal Well-Being  - emotional support provided  - bonding with infant    #[place] Feeding  - breastfeeding/pumping encouraged; lactation consult prn    #Dispo  - Anticipate DC PPD#3 pending BP control.  - The signs and symptoms of PEC were reviewed with the patient, including unrelenting headache, vision changes/blurred vision, and RUQ pain  - BP cuff for home for checking BP twice a day  - Pt instructed to call primary OB if SBP > 160 or DBP > 110 or if development of PEC symptoms   - On discharge, follow up with primary OB in:       - 2-5 days for BP check        Principal Problem:    Labor and delivery, indication for care  Active Problems:    Chronic hypertension affecting pregnancy    Pregnancy Problems (from 24 to present)       Problem Noted Resolved    Chronic hypertension affecting pregnancy 2024 by Tina Olivia MD No    Priority:  Medium      Overview Signed 2024  3:52 PM by Jaqueline Matute MD     Nifed 30  Normotensive on admission  Asymptomatic  HELLP labs pending on admission         Labor and delivery, indication for care 2024 by Kait Jernigan MD No    Priority:  Medium      Overview Addendum 2024  3:53 PM by Jaqueline Matute MD     Admitted, consented, scanned - cephalic  IOL with crb and cyto  PNLs reviewed, wnl - PNC in [place] and with [facility] (records scanned into chart)  Abnl 1 hr, nl 3hr  GBS  neg  ppBC: declines                 Hospital course: no complications  Vaginal Birth  Patient is currently breastfeedingThe patient's blood type is O POS. The baby's blood type is O POS . Rhogam is not  indicated.    Subjective   Her pain is well controlled with current medications  She is passing flatus  She is ambulating well  She is tolerating a Adult diet Regular  She reports no breast or nursing problems  She denies emotional concerns today   Her plan for contraception is none     Bonding with baby, feels well.  Discussed chtn sx, nifed30 and early follow up.  Pt. Agrees with plan.  Would like to follow up at [facility] with a midwife.    Objective   Allergies:   Patient has no known allergies.         Last Vitals:  Temp Pulse Resp BP MAP Pulse Ox   36.5 °C (97.7 °F) 72 16 116/73   95 %     Vitals Min/Max Last 24 Hours:  Temp  Min: 36 °C (96.8 °F)  Max: 37.5 °C (99.5 °F)  Pulse  Min: 63  Max: 110  Resp  Min: 16  Max: 19  BP  Min: 103/65  Max: 144/83    Intake/Output:     Intake/Output Summary (Last 24 hours) at 2/22/2024 1426  Last data filed at 2/22/2024 0022  Gross per 24 hour   Intake --   Output 356 ml   Net -356 ml       Physical Exam:  General: Examination reveals a well developed, well nourished, female, in no acute distress. She is alert and cooperative.  Lungs: symmetrical, non-labored breathing.  Cardiac: warm, well-perfused.  Abdomen: soft, non-tender.  Fundus: firm, at umbilicus, and nontender.  Extremities: no redness or tenderness in the calves or thighs.  Neurological: alert, oriented, normal speech, no focal findings or movement disorder noted.     Lab Data:  Lab Results   Component Value Date    WBC 5.6 02/19/2024    HGB 11.1 (L) 02/19/2024    HCT 34.0 (L) 02/19/2024     02/19/2024     Lab Results   Component Value Date    GLUCOSE 60 (L) 02/19/2024     02/19/2024    K 4.5 02/19/2024     02/19/2024    CO2 23 02/19/2024    ANIONGAP 14 02/19/2024    BUN 10 02/19/2024    CREATININE 0.69 02/19/2024    EGFR >90 02/19/2024    CALCIUM 9.0 02/19/2024    ALBUMIN 3.2 (L) 02/19/2024    PROT 5.4 (L) 02/19/2024    ALKPHOS 142 (H) 02/19/2024    ALT 17 02/19/2024    AST 22 02/19/2024    BILITOT  0.6 02/19/2024

## 2024-02-23 VITALS
TEMPERATURE: 97.7 F | RESPIRATION RATE: 16 BRPM | BODY MASS INDEX: 32.54 KG/M2 | HEART RATE: 71 BPM | WEIGHT: 176.81 LBS | SYSTOLIC BLOOD PRESSURE: 138 MMHG | HEIGHT: 62 IN | OXYGEN SATURATION: 96 % | DIASTOLIC BLOOD PRESSURE: 89 MMHG

## 2024-02-23 PROCEDURE — 2500000002 HC RX 250 W HCPCS SELF ADMINISTERED DRUGS (ALT 637 FOR MEDICARE OP, ALT 636 FOR OP/ED)

## 2024-02-23 PROCEDURE — 99238 HOSP IP/OBS DSCHRG MGMT 30/<: CPT

## 2024-02-23 PROCEDURE — 2500000001 HC RX 250 WO HCPCS SELF ADMINISTERED DRUGS (ALT 637 FOR MEDICARE OP)

## 2024-02-23 RX ORDER — IBUPROFEN 600 MG/1
600 TABLET ORAL EVERY 6 HOURS
Qty: 40 TABLET | Refills: 0 | Status: SHIPPED | OUTPATIENT
Start: 2024-02-23 | End: 2024-03-04

## 2024-02-23 RX ORDER — NIFEDIPINE 30 MG/1
30 TABLET, FILM COATED, EXTENDED RELEASE ORAL
Qty: 30 TABLET | Refills: 0 | Status: SHIPPED | OUTPATIENT
Start: 2024-02-24 | End: 2024-03-14 | Stop reason: SDUPTHER

## 2024-02-23 RX ORDER — POLYETHYLENE GLYCOL 3350 17 G/17G
17 POWDER, FOR SOLUTION ORAL 2 TIMES DAILY
Qty: 10 PACKET | Refills: 0 | Status: SHIPPED | OUTPATIENT
Start: 2024-02-23 | End: 2024-02-28

## 2024-02-23 RX ORDER — ACETAMINOPHEN 325 MG/1
975 TABLET ORAL EVERY 6 HOURS
Qty: 120 TABLET | Refills: 0 | Status: SHIPPED | OUTPATIENT
Start: 2024-02-23 | End: 2024-03-04

## 2024-02-23 RX ADMIN — IBUPROFEN 600 MG: 600 TABLET, FILM COATED ORAL at 00:22

## 2024-02-23 RX ADMIN — ACETAMINOPHEN 975 MG: 325 TABLET ORAL at 00:21

## 2024-02-23 RX ADMIN — NIFEDIPINE 30 MG: 30 TABLET, FILM COATED, EXTENDED RELEASE ORAL at 06:28

## 2024-02-23 ASSESSMENT — PAIN SCALES - GENERAL
PAINLEVEL_OUTOF10: 0 - NO PAIN
PAINLEVEL_OUTOF10: 2

## 2024-02-23 NOTE — LACTATION NOTE
Lactation Consultant Note    Recommendations/Summary  Checked in with this mom to see how breastfeeding has been going prior to discharge. Mom states that she recently tried to latch baby, but he was recently circumcised and did not want to wake up to latch. Baby was in a deep sleep on mom's chest when I entered the room. Mom states that baby has typically been latching every 2-3 hours and is feeding for at least 20 minutes at a time, if not longer. Mom says that baby has a preference for the left breast. We discussed some techniques mom can use to latch baby to the right breast. I suggested starting baby on the left breast first for 5-10 minutes and then unlatching him and switching him to the right breast. I also suggested trying football hold at the right breast because mom said that she feels that cradle and cross cradle holds are awkward on that side. Mom said that she did football hold at the right breast with Valerie from lactation yesterday and that she was comfortable with the position when Valerie assisted her, so she will try it again. Reviewed benefits of skin to skin for mom and baby and for mom's milk production and supply. Discussed  hunger and satiety cues. I answered mom's questions about how to build a freezer breast milk supply to prepare for going back to work in April. A breast pump was ordered a couple of days ago for home use and her friend will also be mailing her a pump to use. We reviewed the outpatient lactation information.

## 2024-02-23 NOTE — CARE PLAN
The patient's goals for the shift include      The clinical goals for the shift include Adequate pain control, normal VS    Problem: Postpartum  Goal: Experiences normal postpartum course  Outcome: Met  Goal: Appropriate maternal -  bonding  Outcome: Met  Goal: Establish and maintain infant feeding pattern for adequate nutrition  Outcome: Met  Goal: Incisions, wounds, or drain sites healing without S/S of infection  Outcome: Met  Goal: No s/sx infection  Outcome: Met  Goal: No s/sx of hemorrhage  Outcome: Met  Goal: Minimal s/sx of HDP and BP<160/110  Outcome: Met     Problem: Hypertensive Disorder of Pregnancy (HDP)  Goal: Minimal s/sx of HDP and BP<160/110  Outcome: Met  Goal: Adequate urine output (0.5 ml/kg/hr)  Outcome: Met

## 2024-02-23 NOTE — LACTATION NOTE
Lactation Consultant Note  Lactation Consultation  Reason for Consult: Follow-up assessment, Other (Comment) (Bishnu FINLEY request for assistance with latching)  Consultant Name: Tatiana Foley, TUSHAR, IBCLC    Maternal Information       Maternal Assessment  Breast Assessment: Medium, Symmetrical, Soft, Compressible, Other (Comment) (expressible)  Nipple Assessment: Intact, Erect, Rounded after feeding, Other (Comment) (slightly reddened at the tips of the nipples)  Areola Assessment: Normal    Infant Assessment  Infant Behavior: Sleepy, Readiness to feed, Suckles on and off, needs stimulation  Infant Assessment: Palate - high/arch/bubble/normal, Tongue protrudes over alveolar ridge, Good cupping of tongue    Feeding Assessment  Nutrition Source: Breastmilk  Feeding Method: Nursing at the breast  Feeding Position: C - hold, Football/seated, Skin to skin, Both sides, Nipple to nose, Mother needs assistance with latch/positioning  Suck/Feeding: Sustained, Coordinated suck/swallow/breathe, Tactile stimulation needed, Audible swallowing with stimulaton  Latch Assessment: Maximum assistance is needed, Instructed on deep latch, Areolar attachment, Deep latch obtained, Latch achieved after repeated attempts, Optimal angle of mouth opening, Comfortable with no pain, Chin and lower lip contact breast first, Flanged lips    LATCH TOOL  Latch: Repeated attempts, hold nipple in mouth, stimulate to suck  Audible Swallowing: A few with stimulation  Type of Nipple: Everted (After stimulation)  Comfort (Breast/Nipple): Soft/non-tender  Hold (Positioning): Minimal assist, teach one side, mother does other, staff holds  LATCH Score: 7    Breast Pump  Pump: Hospital grade electric pump, Double breast pumping, Massage  Frequency: Other (comment) (encouraged her to pump after every latch and to give the baby the pumped breast milk obtianed)  Duration: Initiate phase  Breast Shield Size and Type: 21 mm    Other OB Lactation  Tools  Lactation Tools: Flanges, Lanolin    Patient Follow-up  Inpatient Lactation Follow-up Needed : Yes  Outpatient Lactation Follow-up: Recommended    Other OB Lactation Documentation  Maternal Risk Factors: Age over 30, primiparity, Hypertension  Infant Risk Factors: Early term birth 37-39 weeks, Other (comment) (elevated bilirubin levels.)    Recommendations/Summary  Asked by bedside RN to assist with latching the baby to the breast. Per RN; mother is receptive to accepting help from lactation at this time.     I offered to assist with latching the baby to the breast and mom was receptive.   Reviewed positioning of baby (in football hold on both breasts), use of pillow support, hand placement on baby and on breast, expression of colostrum to the nipple prior to latching ( mom  is expressible and she was happy to see this),  latching technique, and use of breast compression and stimulation to keep the baby feeding at the breast longer.  Deep latch obtained on both breasts (after many attempts) but did better on the left breast (nipple is more prominent/ naturally larger -bulbous at the tip). Baby needed stimulation to keep feeding at the breast but, noted swallows with stimulation.     Reviewed feeding cues, the normal feeding patterns in the first 24 hours of life, the role of colostrum, output on different days of life, milk production/ supply in the first few days of life, and the benefits of skin to skin.      Per bedside RN baby's bilirubin levels are elevated. Reviewed with mom how this can effect breast feeding vigorness at the breast.  So, encouraged much stimulation to keep the baby feeding at the breast longer.     I decided to set the mom up to pump as well (d/t the elevated bilirubin level and the sleepy feeding) to be proactive.   Oriented mom to pump set up- use- and cleaning of pump parts.   Reviewed the difference between latching and pumping, the benefit of skin to skin, the benefits of breast  massage prior to pumping, expectations of volume with pumping, milk storage and cleaning of pump parts.   PI-123 and [facility] pump cleaning guide given.   Mom advised to use 21 MM flanges.     Encouraged mom to breastfeed on demand with a goal of 8-12 feeds in a 24 hour period.   If baby is not showing feeding cues and it has been 3 hours since the last time the baby was fed or the last time the baby attempted to feed, encouraged her to place baby in skin to skin.    Encouraged her to pump every 3 hours (8-12 times in a 24 hour period) for 20 minutes on both breasts and to give the baby any pumped breast milk.     Once the baby is breast feeding well at the breast consistently; mom does not need to pump; just latch.     Questions answered.   Report to bedside RN.         Encouraged her to utilize the outpatient lactation resources, if needed.   Contact information given.   [phone] [place] or [phone] [place]

## 2024-02-23 NOTE — DISCHARGE SUMMARY
Discharge Summary    Admission Date: 2024  Discharge Date: 2024     Discharge Diagnosis  Labor and delivery, indication for care    Hospital Course  Delivery Date: 2024  10:09 PM   Delivery type: Vaginal, Spontaneous    GA at delivery: 39w2d  Outcome: Living   Anesthesia during delivery: Epidural   Intrapartum complications: None   Feeding method: Breastfeeding Status: Yes     Procedures:    Contraception at discharge: none    Postpartum Course:  - Pain controlled on PO meds  - Hgb: 11.1  - cHTN: Nifed 30mg daily, no PEC sx, requested early DC today     Patient seen on day of DC. Continuing to meet all PP milestones. All questions/concerns addressed. She is medically stable and feels comfortable going home today w/ follow up as below. I have reviewed with the patient the standard 3 day stay for hypertensive disorders and the risks/benefits of early discharge, including death, stroke, seizure, and readmission. Pt verbalized understanding, states that she feels well, and desires discharge today.   - The signs and symptoms of PEC were reviewed with the patient, including unrelenting headache, vision changes/blurred vision, and pain underneath the right breast.   - BP cuff for home for checking BP BID. Pt instructed to call primary OB if SBP > 160 or DBP > 110.   Follow up in 1 wk for BP check with your OB provider. and Follow up in 4-6 wk for postpartum visit with your OB provider.     HENRY Rankin/Mikal      Pertinent Physical Exam At Time of Discharge  General: Examination reveals a well developed, well nourished, female, in no acute distress. She is alert and cooperative.  Abdomen: soft, non-distended, non-tender to palpation.  Fundus: firm, below umbilicus, and nontender.  Psychological: awake and alert; oriented to person, place, and time.  Skin: no rashes or lesions      Discharge Meds     Your medication list        START taking these medications        Instructions Last Dose  Given Next Dose Due   acetaminophen 325 mg tablet  Commonly known as: Tylenol      Take 3 tablets (975 mg) by mouth every 6 hours for 10 days.       ibuprofen 600 mg tablet      Take 1 tablet (600 mg) by mouth every 6 hours for 10 days.       polyethylene glycol 17 gram packet  Commonly known as: Glycolax, Miralax      Take 17 g by mouth 2 times a day for 5 days.              CHANGE how you take these medications        Instructions Last Dose Given Next Dose Due   NIFEdipine ER 30 mg 24 hr tablet  Commonly known as: Adalat CC  What changed: Another medication with the same name was added. Make sure you understand how and when to take each.           NIFEdipine ER 30 mg 24 hr tablet  Commonly known as: Adalat CC  Start taking on: February 24, 2024  What changed: You were already taking a medication with the same name, and this prescription was added. Make sure you understand how and when to take each.      Take 1 tablet (30 mg) by mouth once daily in the morning. Take before meals. Do not crush, chew, or split. Do not start before February 24, 2024.              CONTINUE taking these medications        Instructions Last Dose Given Next Dose Due   aspirin 81 mg EC tablet           ferrous sulfate (325 mg ferrous sulfate) tablet                     Where to Get Your Medications        These medications were sent to [address], [place] - [address] [facility]  [address] [facility] [place] [address]      Phone: [phone]   acetaminophen 325 mg tablet  ibuprofen 600 mg tablet  NIFEdipine ER 30 mg 24 hr tablet  polyethylene glycol 17 gram packet          Complications Requiring Follow-Up  1 wk bp check  4-6 wk ppv    Test Results Pending At Discharge  Pending Labs       Order Current Status    Blood Gas Cord Arterial In process    Blood Gas Cord Venous In process    Surgical Pathology Exam - PLACENTA In process            Outpatient Follow-Up  No future appointments.    I spent 15 minutes in the professional and  overall care of this patient.      Alva Blackwell PA-C

## 2024-02-26 LAB
LABORATORY COMMENT REPORT: NORMAL
PATH REPORT.FINAL DX SPEC: NORMAL
PATH REPORT.GROSS SPEC: NORMAL
PATH REPORT.RELEVANT HX SPEC: NORMAL
PATH REPORT.TOTAL CANCER: NORMAL

## 2024-03-04 ENCOUNTER — POSTPARTUM VISIT (OUTPATIENT)
Dept: OBSTETRICS AND GYNECOLOGY | Facility: CLINIC | Age: 44
End: 2024-03-04
Payer: COMMERCIAL

## 2024-03-04 VITALS — WEIGHT: 159 LBS | BODY MASS INDEX: 29.08 KG/M2

## 2024-03-04 DIAGNOSIS — M62.08 DIASTASIS RECTI: ICD-10-CM

## 2024-03-04 DIAGNOSIS — I10 CHRONIC HYPERTENSION: Primary | ICD-10-CM

## 2024-03-04 ASSESSMENT — EDINBURGH POSTNATAL DEPRESSION SCALE (EPDS)
I HAVE BEEN ABLE TO LAUGH AND SEE THE FUNNY SIDE OF THINGS: AS MUCH AS I ALWAYS COULD
I HAVE BLAMED MYSELF UNNECESSARILY WHEN THINGS WENT WRONG: NO, NEVER
I HAVE FELT SAD OR MISERABLE: NO, NOT AT ALL
I HAVE BEEN ANXIOUS OR WORRIED FOR NO GOOD REASON: NO, NOT AT ALL
I HAVE BEEN SO UNHAPPY THAT I HAVE BEEN CRYING: NO, NEVER
THINGS HAVE BEEN GETTING ON TOP OF ME: NO, I HAVE BEEN COPING AS WELL AS EVER
I HAVE FELT SCARED OR PANICKY FOR NO GOOD REASON: NO, NOT AT ALL
TOTAL SCORE: 0
I HAVE BEEN SO UNHAPPY THAT I HAVE HAD DIFFICULTY SLEEPING: NOT AT ALL
I HAVE LOOKED FORWARD WITH ENJOYMENT TO THINGS: AS MUCH AS I EVER DID
THE THOUGHT OF HARMING MYSELF HAS OCCURRED TO ME: NEVER

## 2024-03-04 ASSESSMENT — PAIN SCALES - GENERAL: PAINLEVEL: 0-NO PAIN

## 2024-03-04 NOTE — PROGRESS NOTES
Assessment   IMPRESSIONS:  Thank you for coming to your postpartum visit. Everything seems to be recovering appropriately at this time. You are free to resume normal activity. Please don't hesitate to call us for breast complaints, abnormal bleeding, mood changes, or other concerning symptoms - we have many good treatment options for these issues.     Carlota was seen today for postpartum care.  Diagnoses and all orders for this visit:  Chronic hypertension (Primary)  -     Referral to Primary Care; Future  Diastasis recti  -     Referral to Physical Therapy; Future      We look forward to seeing you again at your next scheduled visit in 12 months.    Kylee Young, MIRTA-ROSITA    Subjective   43 y.o.  presenting for postpartum follow-up     Delivery Date: 24  GA at Delivery: 39.2 wks  Type of Delivery: term IOL-->     Pregnancy notable for:  cHTN  [id number] fibroid    Concerns: none today     Pain: controlled  Lacerations: 1st degree with repair, no pain now  Lochia: stopped bleeding yesterday  Menses: none yet  Sexual Intimacy: not yet  Contraceptive Method: declines  Feeding Method: breast and formula feeding  Lactation Consult Needed?: no    Birth Trauma: No  Bonding with Baby: well with   Mood: stable, without concern for PPD/PPA    Last pap  in [place], normal. Remote h/o abnormal in her 20s, normal since then.     Other: Dalia, was on amlodipine prior to pregnancy, Nifedipine during pregnancy which she continues to take.     Objective   Wt 72.1 kg (159 lb)   LMP 2023   Breastfeeding Yes Comment: breastfeeding and bottle  BMI 29.08 kg/m²      General:   Alert and oriented, in no acute distress   Neck: Supple. No visible thyromegaly.    Breast/Axilla: Normal to palpation bilaterally without masses, skin changes, or nipple discharge.    Abdomen: Soft, non-tender, without masses or organomegaly, diastasis recti present                           Psych Normal affect. Normal mood.

## 2024-03-14 ENCOUNTER — OFFICE VISIT (OUTPATIENT)
Dept: PRIMARY CARE | Facility: CLINIC | Age: 44
End: 2024-03-14
Payer: COMMERCIAL

## 2024-03-14 VITALS
HEART RATE: 86 BPM | BODY MASS INDEX: 28.34 KG/M2 | DIASTOLIC BLOOD PRESSURE: 90 MMHG | HEIGHT: 62 IN | SYSTOLIC BLOOD PRESSURE: 150 MMHG | OXYGEN SATURATION: 95 % | WEIGHT: 154 LBS

## 2024-03-14 DIAGNOSIS — O10.919 CHRONIC HYPERTENSION AFFECTING PREGNANCY (HHS-HCC): ICD-10-CM

## 2024-03-14 DIAGNOSIS — Z76.89 ENCOUNTER TO ESTABLISH CARE: Primary | ICD-10-CM

## 2024-03-14 DIAGNOSIS — I10 PRIMARY HYPERTENSION: ICD-10-CM

## 2024-03-14 DIAGNOSIS — I10 CHRONIC HYPERTENSION: ICD-10-CM

## 2024-03-14 PROCEDURE — 3077F SYST BP >= 140 MM HG: CPT | Performed by: STUDENT IN AN ORGANIZED HEALTH CARE EDUCATION/TRAINING PROGRAM

## 2024-03-14 PROCEDURE — 99204 OFFICE O/P NEW MOD 45 MIN: CPT | Performed by: STUDENT IN AN ORGANIZED HEALTH CARE EDUCATION/TRAINING PROGRAM

## 2024-03-14 PROCEDURE — 3080F DIAST BP >= 90 MM HG: CPT | Performed by: STUDENT IN AN ORGANIZED HEALTH CARE EDUCATION/TRAINING PROGRAM

## 2024-03-14 PROCEDURE — 1036F TOBACCO NON-USER: CPT | Performed by: STUDENT IN AN ORGANIZED HEALTH CARE EDUCATION/TRAINING PROGRAM

## 2024-03-14 RX ORDER — NIFEDIPINE 30 MG/1
30 TABLET, FILM COATED, EXTENDED RELEASE ORAL
Qty: 90 TABLET | Refills: 1 | Status: SHIPPED | OUTPATIENT
Start: 2024-03-14 | End: 2024-04-13

## 2024-03-14 ASSESSMENT — ENCOUNTER SYMPTOMS
DEPRESSION: 0
LOSS OF SENSATION IN FEET: 0
OCCASIONAL FEELINGS OF UNSTEADINESS: 0

## 2024-03-14 NOTE — PROGRESS NOTES
"Subjective   Patient ID: Carlota Garrido is a 43 y.o. female who presents for New Patient Visit (Establish care. Pt declined flu shot. ).        HPI  Est care   Pt's PMH, PSH, SH, FH , meds and allergies was obtained / reviewed and updated .   3 weeks PP, DOD 2/21/24     HTN  Diagnosed in 2015    Was on amlodipine 5mg prior to being on Nifedipine  in pregnancy    Visit Vitals  /90   Pulse 86   Ht 1.575 m (5' 2\")   Wt 69.9 kg (154 lb)   LMP 05/22/2023   SpO2 95%   Breastfeeding Yes   BMI 28.17 kg/m²   OB Status Recent pregnancy   Smoking Status Never   BSA 1.75 m²      Patient's last menstrual period was 05/22/2023.     Review of Systems    Constitutional : No feeling poorly / fevers/ chills / night sweats/ fatigue   Cardiovascular : No CP /Palpitations/ lower extremity edema / syncope   Respiratory : No Cough /STREETER/Dyspnea at rest   Gastrointestinal : No abd pain / N/V  No bloody stools/ melena / constipation  Endo : No polyuria/polydipsia/ muscle weakness / sluggishness   CNS: No confusion / HA/ tingling/ numbness/ weakness of extremities  Psychiatric: No anxiety/ depression/ SI/HI    All other systems have been reviewed and are negative for complaint       Physical Exam    Constitutional : Vitals reviewed. Alert and in no distress  Cardiovascular : RRR, Normal S1, S2, No pericardial rub/ gallop, no peripheral edema   Pulmonary: No respiratory distress, CTAB   MSK : Normal gait and station , strength and tone     Neurologic : CNs 2-12 grossly intact , no obvious FNDs  Psych : A,Ox3, normal mood and affect      Assessment/Plan   Diagnoses and all orders for this visit:  Encounter to establish care  Chronic hypertension  -     Referral to Primary Care  Primary hypertension  Chronic hypertension affecting pregnancy  -     NIFEdipine ER (Adalat CC) 30 mg 24 hr tablet; Take 1 tablet (30 mg) by mouth once daily in the morning. Take before meals. Do not crush, chew, or split.       42 y/o female with HTN , now 3 " weeks PP presents to [facility] care     # Rpt BP as noted in vitals , did not take med today   Resume med, pt reports controlled BP  with taking meds     Labs done prior to delivery reviewed   RTO in 6m       Conditions addressed and mgmt as noted above.  Pertinent labs, images/ imaging reports , chart review was done .   Age appropriate labs / labs for mgmt of chronic medical conditions ordered, further mgmt pending the results.

## 2024-04-01 ENCOUNTER — POSTPARTUM VISIT (OUTPATIENT)
Dept: OBSTETRICS AND GYNECOLOGY | Facility: CLINIC | Age: 44
End: 2024-04-01
Payer: COMMERCIAL

## 2024-04-01 VITALS
HEIGHT: 62 IN | BODY MASS INDEX: 28.17 KG/M2 | DIASTOLIC BLOOD PRESSURE: 96 MMHG | HEART RATE: 80 BPM | SYSTOLIC BLOOD PRESSURE: 144 MMHG

## 2024-04-01 DIAGNOSIS — Z12.31 BREAST CANCER SCREENING BY MAMMOGRAM: ICD-10-CM

## 2024-04-01 DIAGNOSIS — I10 CHRONIC HYPERTENSION: Primary | ICD-10-CM

## 2024-04-01 PROCEDURE — 0503F POSTPARTUM CARE VISIT: CPT | Performed by: ADVANCED PRACTICE MIDWIFE

## 2024-04-01 ASSESSMENT — EDINBURGH POSTNATAL DEPRESSION SCALE (EPDS)
I HAVE BEEN SO UNHAPPY THAT I HAVE BEEN CRYING: NO, NEVER
I HAVE LOOKED FORWARD WITH ENJOYMENT TO THINGS: AS MUCH AS I EVER DID
THINGS HAVE BEEN GETTING ON TOP OF ME: NO, I HAVE BEEN COPING AS WELL AS EVER
I HAVE FELT SAD OR MISERABLE: NO, NOT AT ALL
I HAVE BEEN ABLE TO LAUGH AND SEE THE FUNNY SIDE OF THINGS: AS MUCH AS I ALWAYS COULD
I HAVE FELT SCARED OR PANICKY FOR NO GOOD REASON: NO, NOT AT ALL
I HAVE BEEN SO UNHAPPY THAT I HAVE HAD DIFFICULTY SLEEPING: NOT AT ALL
I HAVE BEEN ANXIOUS OR WORRIED FOR NO GOOD REASON: YES, VERY OFTEN
I HAVE BLAMED MYSELF UNNECESSARILY WHEN THINGS WENT WRONG: YES, MOST OF THE TIME

## 2024-04-01 ASSESSMENT — ENCOUNTER SYMPTOMS
ALLERGIC/IMMUNOLOGIC NEGATIVE: 0
CONSTITUTIONAL NEGATIVE: 0
ENDOCRINE NEGATIVE: 0
EYES NEGATIVE: 0
GASTROINTESTINAL NEGATIVE: 0
MUSCULOSKELETAL NEGATIVE: 0
NEUROLOGICAL NEGATIVE: 0
CARDIOVASCULAR NEGATIVE: 0
HEMATOLOGIC/LYMPHATIC NEGATIVE: 0
PSYCHIATRIC NEGATIVE: 0
RESPIRATORY NEGATIVE: 0

## 2024-04-01 ASSESSMENT — PAIN SCALES - GENERAL: PAINLEVEL: 0-NO PAIN

## 2024-04-01 NOTE — PROGRESS NOTES
"Plan    Advised to call office for breast complaints, abnormal bleeding, mood changes, or other concerning symptoms.   Cleared to resume normal activity as desired  Already following with PCP for management of cHTN. Will stay on nifedipine for now per PCP.   Return for annual exam or sooner prn.     Diagnoses and all orders for this visit:  Chronic hypertension  Breast cancer screening by mammogram  -     BI mammo bilateral screening tomosynthesis; Future      AubreeAundreaemil Young, APRN-CNM    Subjective   43 y.o.  presenting for postpartum follow-up     Delivery Date: 24  Gestational Age: 39.2wks  Type of Delivery: Vaginal, Spontaneous      Pregnancy Problems (from 24 to present)       Problem Noted Resolved    Chronic hypertension affecting pregnancy 2024 by Tina Olivia MD No    Overview Signed 2024  3:52 PM by Jaqueline Matute MD     Nifed 30  Normotensive on admission  Asymptomatic  HELLP labs pending on admission         Labor and delivery, indication for care 2024 by Kait Jernigan MD No    Overview Addendum 2024  3:53 PM by Jaqueline Matute MD     Admitted, consented, scanned - cephalic  IOL with crb and cyto  PNLs reviewed, wnl - PNC in [place] and with [facility] (records scanned into chart)  Abnl 1 hr, nl 3hr  GBS  neg  ppBC: declines                   Concerns: LizTN, on meds, reports home BP are normotensive though she did not take her dose of nifedipine today until she was checking in for her appointment.     Pain: controlled  Lacerations: 1st degree  Lochia: x2 weeks, occasional spotting, thinks her period is starting soon  Sexual Intimacy: No  Contraceptive Method: None  Feeding Method: She is breast feeding with some supplementation. no breast or nursing problems  Lactation Consult Needed?: No    Birth Trauma: No  Bonding with Baby: well with baby boy,  \"Arnaldo\"  Mood:   Postpartum Depression: Low Risk  (3/4/2024)    [place]  Depression Scale     Last EPDS " "Total Score: 0     Last EPDS Self Harm Result: Never       Last pap: 2022 in [place], wnl per patient  Objective    BP (!) 144/96   Pulse 80   Ht 1.575 m (5' 2\")   LMP 05/22/2023   Breastfeeding Yes Comment: Breast and formula  BMI 28.17 kg/m²    Physical Exam  Constitutional:       Appearance: Normal appearance.   Genitourinary:   Breasts:     Breasts are soft.     Right: Normal.      Left: Normal.   Eyes:      Conjunctiva/sclera: Conjunctivae normal.   Pulmonary:      Effort: Pulmonary effort is normal.   Abdominal:      General: Abdomen is flat. There is no distension.      Palpations: Abdomen is soft. There is no mass.      Tenderness: There is no abdominal tenderness. There is no right CVA tenderness, left CVA tenderness, guarding or rebound.      Hernia: No hernia is present.   Musculoskeletal:      Cervical back: Neck supple. No tenderness.   Lymphadenopathy:      Cervical: No cervical adenopathy.   Neurological:      Mental Status: She is alert and oriented to person, place, and time.   Skin:     General: Skin is warm and dry.   Psychiatric:         Mood and Affect: Mood normal.         Behavior: Behavior normal.        "

## 2024-09-05 ENCOUNTER — APPOINTMENT (OUTPATIENT)
Dept: PRIMARY CARE | Facility: CLINIC | Age: 44
End: 2024-09-05
Payer: COMMERCIAL

## 2024-09-23 ENCOUNTER — APPOINTMENT (OUTPATIENT)
Dept: PRIMARY CARE | Facility: CLINIC | Age: 44
End: 2024-09-23
Payer: COMMERCIAL

## 2024-09-23 VITALS
HEIGHT: 62 IN | BODY MASS INDEX: 27.75 KG/M2 | SYSTOLIC BLOOD PRESSURE: 120 MMHG | OXYGEN SATURATION: 99 % | DIASTOLIC BLOOD PRESSURE: 78 MMHG | WEIGHT: 150.8 LBS | HEART RATE: 89 BPM

## 2024-09-23 DIAGNOSIS — I10 PRIMARY HYPERTENSION: Primary | ICD-10-CM

## 2024-09-23 DIAGNOSIS — O10.919 CHRONIC HYPERTENSION AFFECTING PREGNANCY (HHS-HCC): ICD-10-CM

## 2024-09-23 PROCEDURE — 3078F DIAST BP <80 MM HG: CPT | Performed by: STUDENT IN AN ORGANIZED HEALTH CARE EDUCATION/TRAINING PROGRAM

## 2024-09-23 PROCEDURE — 3008F BODY MASS INDEX DOCD: CPT | Performed by: STUDENT IN AN ORGANIZED HEALTH CARE EDUCATION/TRAINING PROGRAM

## 2024-09-23 PROCEDURE — 1036F TOBACCO NON-USER: CPT | Performed by: STUDENT IN AN ORGANIZED HEALTH CARE EDUCATION/TRAINING PROGRAM

## 2024-09-23 PROCEDURE — 3074F SYST BP LT 130 MM HG: CPT | Performed by: STUDENT IN AN ORGANIZED HEALTH CARE EDUCATION/TRAINING PROGRAM

## 2024-09-23 PROCEDURE — 99213 OFFICE O/P EST LOW 20 MIN: CPT | Performed by: STUDENT IN AN ORGANIZED HEALTH CARE EDUCATION/TRAINING PROGRAM

## 2024-09-23 RX ORDER — NIFEDIPINE 30 MG/1
30 TABLET, FILM COATED, EXTENDED RELEASE ORAL
Qty: 90 TABLET | Refills: 3 | Status: SHIPPED | OUTPATIENT
Start: 2024-09-23 | End: 2024-10-23

## 2024-09-23 NOTE — PROGRESS NOTES
"Subjective   Patient ID: Carlota Garrido is a 44 y.o. female who presents for Follow-up (6 month follow-up. Pt declined flu shot.).        HPI        43 y/o female now 6m pp presents for htn mgmt     Fu on htn   Rpt BP WNL       Visit Vitals  /78   Pulse 89   Ht 1.575 m (5' 2\")   Wt 68.4 kg (150 lb 12.8 oz)   LMP 08/28/2024   SpO2 99%   Breastfeeding Yes   BMI 27.58 kg/m²   OB Status Recent pregnancy   Smoking Status Never   BSA 1.73 m²      Patient's last menstrual period was 08/28/2024.   Current Outpatient Medications   Medication Instructions    aspirin 81 mg, oral, Daily    ferrous sulfate (325 mg ferrous sulfate) 325 mg, oral, Daily with breakfast    NIFEdipine ER (ADALAT CC) 30 mg, oral, Daily before breakfast, Do not crush, chew, or split.      Social History     Tobacco Use    Smoking status: Never    Smokeless tobacco: Never   Substance Use Topics    Alcohol use: Never        Review of Systems    Constitutional : No feeling poorly / fevers/ chills / night sweats/ fatigue   Cardiovascular : No CP /Palpitations/ lower extremity edema / syncope   Respiratory : No Cough /STREETER/Dyspnea at rest   Gastrointestinal : No abd pain / N/V  No bloody stools/ melena / constipation  Endo : No polyuria/polydipsia/ muscle weakness / sluggishness   CNS: No confusion / HA/ tingling/ numbness/ weakness of extremities  Psychiatric: No anxiety/ depression/ SI/HI    All other systems have been reviewed and are negative for complaint       Physical Exam    Constitutional : Vitals reviewed. Alert and in no distress  Cardiovascular : RRR, Normal S1, S2, No pericardial rub/ gallop, no peripheral edema   Pulmonary: No respiratory distress, CTAB   MSK : Normal gait and station , strength and tone   Skin: Warm to touch ,  normal skin turgor   Neurologic : CNs 2-12 grossly intact , no obvious FNDs  Psych : A,Ox3, normal mood and affect      Assessment/Plan   Diagnoses and all orders for this visit:  Primary " hypertension  Chronic hypertension affecting pregnancy (HHS-HCC)  -     NIFEdipine ER (Adalat CC) 30 mg 24 hr tablet; Take 1 tablet (30 mg) by mouth once daily in the morning. Take before meals. Do not crush, chew, or split.      Htn: at goal   Defer labs at this time due to breast feeding state   Rto in 1year             Conditions addressed and mgmt as noted above.  Pertinent labs, images/ imaging reports , chart review was done .   Age appropriate labs / labs for mgmt of chronic medical conditions ordered, further mgmt pending the results.       This note is intended for the physician writing it, as well as to communicate findings to other healthcare professionals. These notes use medical lexicon that may be misunderstood by non medical persons. Therefore, interpretations of medical notes and terminology should be approached with caution.

## 2024-10-18 ENCOUNTER — APPOINTMENT (OUTPATIENT)
Dept: RADIOLOGY | Facility: CLINIC | Age: 44
End: 2024-10-18
Payer: MEDICAID

## 2025-01-21 DIAGNOSIS — O10.919 CHRONIC HYPERTENSION AFFECTING PREGNANCY (HHS-HCC): ICD-10-CM

## 2025-01-21 RX ORDER — NIFEDIPINE 30 MG/1
TABLET, EXTENDED RELEASE ORAL
Qty: 90 TABLET | Refills: 1 | Status: SHIPPED | OUTPATIENT
Start: 2025-01-21

## 2025-04-21 ENCOUNTER — HOSPITAL ENCOUNTER (OUTPATIENT)
Dept: RADIOLOGY | Facility: CLINIC | Age: 45
Discharge: HOME | End: 2025-04-21
Payer: MEDICAID

## 2025-04-21 VITALS — WEIGHT: 150.79 LBS | HEIGHT: 62 IN | BODY MASS INDEX: 27.75 KG/M2

## 2025-04-21 DIAGNOSIS — Z12.31 BREAST CANCER SCREENING BY MAMMOGRAM: ICD-10-CM

## 2025-04-21 PROCEDURE — 77063 BREAST TOMOSYNTHESIS BI: CPT | Performed by: RADIOLOGY

## 2025-04-21 PROCEDURE — 77063 BREAST TOMOSYNTHESIS BI: CPT

## 2025-04-21 PROCEDURE — 77067 SCR MAMMO BI INCL CAD: CPT | Performed by: RADIOLOGY

## 2025-04-24 ENCOUNTER — HOSPITAL ENCOUNTER (OUTPATIENT)
Dept: RADIOLOGY | Facility: EXTERNAL LOCATION | Age: 45
Discharge: HOME | End: 2025-04-24

## 2025-04-24 ENCOUNTER — APPOINTMENT (OUTPATIENT)
Dept: RADIOLOGY | Facility: HOSPITAL | Age: 45
End: 2025-04-24
Payer: MEDICARE

## 2025-04-24 ENCOUNTER — HOSPITAL ENCOUNTER (EMERGENCY)
Facility: HOSPITAL | Age: 45
Discharge: HOME | End: 2025-04-24
Payer: MEDICARE

## 2025-04-24 VITALS
RESPIRATION RATE: 18 BRPM | HEART RATE: 78 BPM | HEIGHT: 62 IN | SYSTOLIC BLOOD PRESSURE: 153 MMHG | BODY MASS INDEX: 28.4 KG/M2 | DIASTOLIC BLOOD PRESSURE: 97 MMHG | OXYGEN SATURATION: 100 % | WEIGHT: 154.32 LBS | TEMPERATURE: 97.8 F

## 2025-04-24 DIAGNOSIS — M79.662 PAIN IN LEFT SHIN: ICD-10-CM

## 2025-04-24 DIAGNOSIS — M54.2 NECK PAIN: ICD-10-CM

## 2025-04-24 DIAGNOSIS — M79.661 PAIN IN RIGHT SHIN: ICD-10-CM

## 2025-04-24 DIAGNOSIS — R51.9 ACUTE NONINTRACTABLE HEADACHE, UNSPECIFIED HEADACHE TYPE: ICD-10-CM
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
## 2025-04-24 DIAGNOSIS — M54.50 ACUTE MIDLINE LOW BACK PAIN WITHOUT SCIATICA: ICD-10-CM

## 2025-04-24 LAB
ALBUMIN SERPL BCP-MCNC: 4.1 G/DL (ref 3.4–5)
ALP SERPL-CCNC: 40 U/L (ref 33–110)
ALT SERPL W P-5'-P-CCNC: 8 U/L (ref 7–45)
ANION GAP SERPL CALC-SCNC: 10 MMOL/L (ref 10–20)
AST SERPL W P-5'-P-CCNC: 15 U/L (ref 9–39)
B-HCG SERPL-ACNC: <2 MIU/ML
BASOPHILS # BLD AUTO: 0.03 X10*3/UL (ref 0–0.1)
BASOPHILS NFR BLD AUTO: 0.6 %
BILIRUB SERPL-MCNC: 0.6 MG/DL (ref 0–1.2)
BUN SERPL-MCNC: 23 MG/DL (ref 6–23)
BURR CELLS BLD QL SMEAR: NORMAL
CALCIUM SERPL-MCNC: 8.8 MG/DL (ref 8.6–10.3)
CHLORIDE SERPL-SCNC: 108 MMOL/L (ref 98–107)
CO2 SERPL-SCNC: 25 MMOL/L (ref 21–32)
CREAT SERPL-MCNC: 0.73 MG/DL (ref 0.5–1.05)
EGFRCR SERPLBLD CKD-EPI 2021: >90 ML/MIN/1.73M*2
EOSINOPHIL # BLD AUTO: 0.08 X10*3/UL (ref 0–0.7)
EOSINOPHIL NFR BLD AUTO: 1.6 %
ERYTHROCYTE [DISTWIDTH] IN BLOOD BY AUTOMATED COUNT: 16.9 % (ref 11.5–14.5)
GLUCOSE SERPL-MCNC: 83 MG/DL (ref 74–99)
HCT VFR BLD AUTO: 35.2 % (ref 36–46)
HGB BLD-MCNC: 11.3 G/DL (ref 12–16)
IMM GRANULOCYTES # BLD AUTO: 0.02 X10*3/UL (ref 0–0.7)
IMM GRANULOCYTES NFR BLD AUTO: 0.4 % (ref 0–0.9)
LYMPHOCYTES # BLD AUTO: 0.9 X10*3/UL (ref 1.2–4.8)
LYMPHOCYTES NFR BLD AUTO: 17.4 %
MCH RBC QN AUTO: 23.2 PG (ref 26–34)
MCHC RBC AUTO-ENTMCNC: 32.1 G/DL (ref 32–36)
MCV RBC AUTO: 72 FL (ref 80–100)
MONOCYTES # BLD AUTO: 0.36 X10*3/UL (ref 0.1–1)
MONOCYTES NFR BLD AUTO: 7 %
NEUTROPHILS # BLD AUTO: 3.77 X10*3/UL (ref 1.2–7.7)
NEUTROPHILS NFR BLD AUTO: 73 %
NRBC BLD-RTO: 0 /100 WBCS (ref 0–0)
PLATELET # BLD AUTO: 324 X10*3/UL (ref 150–450)
POTASSIUM SERPL-SCNC: 4.1 MMOL/L (ref 3.5–5.3)
PROT SERPL-MCNC: 6.6 G/DL (ref 6.4–8.2)
RBC # BLD AUTO: 4.88 X10*6/UL (ref 4–5.2)
RBC MORPH BLD: NORMAL
SODIUM SERPL-SCNC: 139 MMOL/L (ref 136–145)
WBC # BLD AUTO: 5.2 X10*3/UL (ref 4.4–11.3)

## 2025-04-24 PROCEDURE — 73590 X-RAY EXAM OF LOWER LEG: CPT | Mod: 50

## 2025-04-24 PROCEDURE — 99285 EMERGENCY DEPT VISIT HI MDM: CPT | Mod: 25

## 2025-04-24 PROCEDURE — 73590 X-RAY EXAM OF LOWER LEG: CPT | Mod: BILATERAL PROCEDURE | Performed by: RADIOLOGY

## 2025-04-24 PROCEDURE — 72131 CT LUMBAR SPINE W/O DYE: CPT | Performed by: RADIOLOGY

## 2025-04-24 PROCEDURE — 74177 CT ABD & PELVIS W/CONTRAST: CPT

## 2025-04-24 PROCEDURE — 70450 CT HEAD/BRAIN W/O DYE: CPT | Performed by: RADIOLOGY

## 2025-04-24 PROCEDURE — 84075 ASSAY ALKALINE PHOSPHATASE: CPT

## 2025-04-24 PROCEDURE — 72125 CT NECK SPINE W/O DYE: CPT

## 2025-04-24 PROCEDURE — 85025 COMPLETE CBC W/AUTO DIFF WBC: CPT

## 2025-04-24 PROCEDURE — 74177 CT ABD & PELVIS W/CONTRAST: CPT | Performed by: RADIOLOGY

## 2025-04-24 PROCEDURE — 72125 CT NECK SPINE W/O DYE: CPT | Performed by: RADIOLOGY

## 2025-04-24 PROCEDURE — 84702 CHORIONIC GONADOTROPIN TEST: CPT

## 2025-04-24 PROCEDURE — 2550000001 HC RX 255 CONTRASTS

## 2025-04-24 PROCEDURE — 2500000001 HC RX 250 WO HCPCS SELF ADMINISTERED DRUGS (ALT 637 FOR MEDICARE OP)

## 2025-04-24 PROCEDURE — 70450 CT HEAD/BRAIN W/O DYE: CPT

## 2025-04-24 PROCEDURE — 36415 COLL VENOUS BLD VENIPUNCTURE: CPT

## 2025-04-24 RX ORDER — OXYCODONE AND ACETAMINOPHEN 5; 325 MG/1; MG/1
1 TABLET ORAL ONCE
Refills: 0 | Status: COMPLETED | OUTPATIENT
Start: 2025-04-24 | End: 2025-04-24

## 2025-04-24 RX ORDER — OXYCODONE AND ACETAMINOPHEN 5; 325 MG/1; MG/1
1 TABLET ORAL EVERY 6 HOURS PRN
Qty: 5 TABLET | Refills: 0 | Status: SHIPPED | OUTPATIENT
Start: 2025-04-24 | End: 2025-04-27

## 2025-04-24 RX ADMIN — OXYCODONE HYDROCHLORIDE AND ACETAMINOPHEN 1 TABLET: 5; 325 TABLET ORAL at 16:31

## 2025-04-24 RX ADMIN — IOHEXOL 75 ML: 350 INJECTION, SOLUTION INTRAVENOUS at 17:21

## 2025-04-24 ASSESSMENT — PAIN SCALES - GENERAL: PAINLEVEL_OUTOF10: 9

## 2025-04-24 ASSESSMENT — PAIN DESCRIPTION - PAIN TYPE: TYPE: ACUTE PAIN

## 2025-04-24 ASSESSMENT — COLUMBIA-SUICIDE SEVERITY RATING SCALE - C-SSRS
2. HAVE YOU ACTUALLY HAD ANY THOUGHTS OF KILLING YOURSELF?: NO
6. HAVE YOU EVER DONE ANYTHING, STARTED TO DO ANYTHING, OR PREPARED TO DO ANYTHING TO END YOUR LIFE?: NO
1. IN THE PAST MONTH, HAVE YOU WISHED YOU WERE DEAD OR WISHED YOU COULD GO TO SLEEP AND NOT WAKE UP?: NO

## 2025-04-24 ASSESSMENT — PAIN DESCRIPTION - ORIENTATION: ORIENTATION: LEFT

## 2025-04-24 ASSESSMENT — PAIN - FUNCTIONAL ASSESSMENT: PAIN_FUNCTIONAL_ASSESSMENT: 0-10

## 2025-04-24 ASSESSMENT — PAIN DESCRIPTION - LOCATION: LOCATION: FACE

## 2025-04-24 NOTE — DISCHARGE INSTRUCTIONS
Can take Tylenol and Motrin as needed for pain  May take Percocet as needed for severe pain.  There is Tylenol in Percocet, do not take Tylenol if you take Percocet.  This is narcotic.  Do not drive, drink alcohol or operate heavy machinery while taking this medication.  Do not take other sedating medications when taking narcotics.  Narcotics can cause constipation.  Drink extra water and eat extra fiber to help prevent this. May take stool softners as needed.  Please follow-up with your primary doctor within 2 to 3 days for repeat evaluation to ensure symptoms are improving  Return to ED for any new or worsening symptoms
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
## 2025-05-19 ENCOUNTER — APPOINTMENT (OUTPATIENT)
Dept: PRIMARY CARE | Facility: CLINIC | Age: 45
End: 2025-05-19
Payer: COMMERCIAL

## 2025-05-19 VITALS
OXYGEN SATURATION: 99 % | SYSTOLIC BLOOD PRESSURE: 140 MMHG | DIASTOLIC BLOOD PRESSURE: 80 MMHG | WEIGHT: 153.4 LBS | HEART RATE: 86 BPM | HEIGHT: 62 IN | BODY MASS INDEX: 28.23 KG/M2

## 2025-05-19 DIAGNOSIS — Z00.00 ROUTINE GENERAL MEDICAL EXAMINATION AT A HEALTH CARE FACILITY: ICD-10-CM

## 2025-05-19 DIAGNOSIS — R71.8 RBC MICROCYTOSIS: ICD-10-CM

## 2025-05-19 DIAGNOSIS — I10 PRIMARY HYPERTENSION: Primary | ICD-10-CM

## 2025-05-19 PROCEDURE — 3079F DIAST BP 80-89 MM HG: CPT | Performed by: STUDENT IN AN ORGANIZED HEALTH CARE EDUCATION/TRAINING PROGRAM

## 2025-05-19 PROCEDURE — 3008F BODY MASS INDEX DOCD: CPT | Performed by: STUDENT IN AN ORGANIZED HEALTH CARE EDUCATION/TRAINING PROGRAM

## 2025-05-19 PROCEDURE — 99214 OFFICE O/P EST MOD 30 MIN: CPT | Performed by: STUDENT IN AN ORGANIZED HEALTH CARE EDUCATION/TRAINING PROGRAM

## 2025-05-19 PROCEDURE — 3077F SYST BP >= 140 MM HG: CPT | Performed by: STUDENT IN AN ORGANIZED HEALTH CARE EDUCATION/TRAINING PROGRAM

## 2025-05-19 PROCEDURE — 1036F TOBACCO NON-USER: CPT | Performed by: STUDENT IN AN ORGANIZED HEALTH CARE EDUCATION/TRAINING PROGRAM

## 2025-05-19 PROCEDURE — 99396 PREV VISIT EST AGE 40-64: CPT | Performed by: STUDENT IN AN ORGANIZED HEALTH CARE EDUCATION/TRAINING PROGRAM

## 2025-05-19 RX ORDER — AMLODIPINE BESYLATE 10 MG/1
10 TABLET ORAL DAILY
Qty: 90 TABLET | Refills: 3 | Status: SHIPPED | OUTPATIENT
Start: 2025-05-19 | End: 2025-11-15

## 2025-05-19 NOTE — PROGRESS NOTES
"  Subjective     Patient ID: Carlota Garrido is a 44 y.o. female who presents for Annual Exam (APE. 160/83).      Last Physical : ___Years ago     Pt's PMH, PSH, SH, FH , meds and allergies was obtained / reviewed and updated .     Dental visits : Y  Vision issues ::   Hearing issues : N    Immunizations : Y    Diet :     Exercise:  Weight concerns :     Alcohol: as noted in   Tobacco: as noted in   Recreational drug use : None/ as noted in     ======================================================    Visit Vitals  /80   Pulse 86   Ht 1.575 m (5' 2\")   Wt 69.6 kg (153 lb 6.4 oz)   LMP 05/08/2025   SpO2 99%   BMI 28.06 kg/m²   OB Status Having periods   Smoking Status Never   BSA 1.74 m²      Patient's last menstrual period was 05/08/2025.   Current Outpatient Medications   Medication Instructions    amLODIPine (NORVASC) 10 mg, oral, Daily    ferrous sulfate 325 mg, Daily with breakfast      Social History     Tobacco Use    Smoking status: Never    Smokeless tobacco: Never   Substance Use Topics    Alcohol use: Never        =====================================    Review of systems:  Constitutional: no chills, no fever and no night sweats.     Eyes: no blurred vision and no eyesight problems.     ENT: no hearing loss, no nasal congestion, no nasal discharge, no hoarseness and no sore throat.     Cardiovascular: no chest pain, no intermittent leg claudication, no lower extremity edema, no palpitations and no syncope.     Respiratory: no cough, no shortness of breath during exertion, no shortness of breath at rest and no wheezing.     Gastrointestinal: no abdominal pain, no blood in stools, no constipation, no diarrhea, no melena, no nausea, no rectal pain and no vomiting.     Genitourinary: no dysuria, no change in urinary frequency, no urinary hesitancy, no feelings of urinary urgency and no vaginal discharge.     Musculoskeletal: no arthralgias, no back pain and no myalgias.     Integumentary: no " new skin lesions and no rashes.     Neurological: no difficulty walking, no headache, no limb weakness, no numbness and no tingling.     Psychiatric: no anxiety, no depression, no anhedonia and no substance use disorders.   ============================================================    Physical exam :    Constitutional: Alert and in no acute distress. Well developed, well nourished.     Eyes: Normal external exam. Pupils were equal in size, round, reactive to light (PERRL) with normal accommodation and extraocular movements intact (EOMI).     Ears, Nose, Mouth, and Throat: External inspection of ears and nose: Normal.  Otoscopic examination: Normal.      Neck: No neck mass was observed. Supple.     Cardiovascular: Heart rate and rhythm were normal, normal S1 and S2, no gallops, no murmurs and no pericardial rub    Pulmonary: No respiratory distress. Clear bilateral breath sounds.     Abdomen: Soft nontender; no abdominal mass palpated. No organomegaly.     Musculoskeletal: No joint swelling seen, normal movements of all extremities. Range of motion: Normal.  Muscle strength/tone: Normal.      Neurologic: Deep tendon reflexes were 2+ and symmetric. Sensation: Normal.     Psychiatric: Judgment and insight: Intact. Mood and affect: Normal.        Assessment/Plan    Diagnoses and all orders for this visit:  Primary hypertension  -     Hemoglobin A1C; Future  -     Lipid Panel; Future  -     TSH with reflex to Free T4 if abnormal; Future  -     amLODIPine (Norvasc) 10 mg tablet; Take 1 tablet (10 mg) by mouth once daily.  Routine general medical examination at a health care facility  RBC microcytosis  -     Iron and TIBC; Future  -     Hemoglobin Identification with Path Review; Future      43 y/o female with HTN  ( diagnosed at age 34 ) presents for APE     Now 14m PP , not breast feeding     Mild anemia with rbc microcytosis : testing as above to eval for hemoglobinopathy   Has not been taking iron  consistently    Htn: rpt bp as noted in vitals   Stop nifedipine   Start amlodipine 10mg   Goal < 130/80          HM :   Vaccines:  -Tdap : utd  -Flu :    -Shingles : at age 51     Cancer screenings:  -Mammogram :  current   -Colonoscopy:   at age 46   -PAP :  to fu with gyn   ? 2022 in [place] per verbal report     General preventive care discussed which includes regular exercise, healthy eating habits, to include more of plant based diet, to include foods of all colors  , limiting excessive red meat intake , staying active to maintain a weight that is appropriate for his/ her height / making efforts to reach an ideal weight for height,  avoidance of smoking, excess alcohol consumption, avoidance of recreational drugs, safe and responsible sexual relationships and practices.     It is recommended to get 150 mins of  moderate intensity  ( you should be able to talk and not sing ) exercise in a week .     Calcium + Vit D supplements recommended   Regular exercise recommended   Healthy eating choices discussed and recommended       Conditions addressed and mgmt as noted above.  Pertinent labs, images/ imaging reports , chart review was done .   Age appropriate labs / labs for mgmt of chronic medical conditions ordered, further mgmt pending the results.         RTO in 12 m or sooner if needed . Labs to be done  fasting .      This note is intended for the physician writing it, as well as to communicate findings to other healthcare professionals. These notes use medical lexicon that may be misunderstood by non medical persons. Therefore, interpretations of medical notes and terminology should be approached with caution.